# Patient Record
Sex: FEMALE | Race: BLACK OR AFRICAN AMERICAN | NOT HISPANIC OR LATINO | Employment: UNEMPLOYED | URBAN - METROPOLITAN AREA
[De-identification: names, ages, dates, MRNs, and addresses within clinical notes are randomized per-mention and may not be internally consistent; named-entity substitution may affect disease eponyms.]

---

## 2017-05-09 ENCOUNTER — TELEPHONE (OUTPATIENT)
Dept: OBSTETRICS AND GYNECOLOGY | Facility: CLINIC | Age: 47
End: 2017-05-09

## 2017-05-09 NOTE — TELEPHONE ENCOUNTER
----- Message from Keerthi Jean sent at 5/9/2017  2:58 PM CDT -----  Contact: self   Pt is requesting a refill for metronidazole (FLAGYL) 500 MG tablet and diflucan [phone]          Thanks

## 2018-01-26 ENCOUNTER — HOSPITAL ENCOUNTER (INPATIENT)
Facility: HOSPITAL | Age: 48
LOS: 3 days | Discharge: HOME | DRG: 557 | End: 2018-01-29
Attending: EMERGENCY MEDICINE | Admitting: HOSPITALIST
Payer: MEDICAID

## 2018-01-26 DIAGNOSIS — R53.1 WEAKNESS: ICD-10-CM

## 2018-01-26 DIAGNOSIS — R79.89 ABNORMAL SERUM THYROID STIMULATING HORMONE (TSH) LEVEL: ICD-10-CM

## 2018-01-26 DIAGNOSIS — N17.9 ACUTE RENAL FAILURE, UNSPECIFIED ACUTE RENAL FAILURE TYPE: Primary | ICD-10-CM

## 2018-01-26 DIAGNOSIS — Z45.2 ENCOUNTER FOR INTRAVENOUS LINE PLACEMENT: ICD-10-CM

## 2018-01-26 DIAGNOSIS — F19.10 POLYSUBSTANCE ABUSE: ICD-10-CM

## 2018-01-26 DIAGNOSIS — R40.4 ALTERED AWARENESS, TRANSIENT: ICD-10-CM

## 2018-01-26 DIAGNOSIS — E86.0 DEHYDRATION: ICD-10-CM

## 2018-01-26 DIAGNOSIS — K92.2 GASTROINTESTINAL HEMORRHAGE, UNSPECIFIED GASTROINTESTINAL HEMORRHAGE TYPE: ICD-10-CM

## 2018-01-26 DIAGNOSIS — R74.01 TRANSAMINITIS: ICD-10-CM

## 2018-01-26 DIAGNOSIS — M62.82 NON-TRAUMATIC RHABDOMYOLYSIS: ICD-10-CM

## 2018-01-26 PROBLEM — F41.9 ANXIETY: Chronic | Status: ACTIVE | Noted: 2018-01-26

## 2018-01-26 PROBLEM — F31.9 BIPOLAR 1 DISORDER: Chronic | Status: ACTIVE | Noted: 2018-01-26

## 2018-01-26 LAB
ABO + RH BLD: NORMAL
ALBUMIN SERPL BCP-MCNC: 2.6 G/DL
ALP SERPL-CCNC: 65 U/L
ALT SERPL W/O P-5'-P-CCNC: 221 U/L
AMMONIA PLAS-SCNC: 28 UMOL/L
AMORPH CRY URNS QL MICRO: ABNORMAL
AMPHET+METHAMPHET UR QL: NEGATIVE
ANION GAP SERPL CALC-SCNC: 13 MMOL/L
APAP SERPL-MCNC: <3 UG/ML
APAP SERPL-MCNC: <3 UG/ML
APTT BLDCRRT: 23.5 SEC
AST SERPL-CCNC: 540 U/L
BACTERIA #/AREA URNS HPF: ABNORMAL /HPF
BARBITURATES UR QL SCN>200 NG/ML: NEGATIVE
BASOPHILS # BLD AUTO: 0.01 K/UL
BASOPHILS NFR BLD: 0.1 %
BENZODIAZ UR QL SCN>200 NG/ML: NORMAL
BILIRUB SERPL-MCNC: 0.4 MG/DL
BILIRUB UR QL STRIP: ABNORMAL
BLD GP AB SCN CELLS X3 SERPL QL: NORMAL
BUN SERPL-MCNC: 67 MG/DL
BZE UR QL SCN: NEGATIVE
CALCIUM SERPL-MCNC: 7.4 MG/DL
CANNABINOIDS UR QL SCN: NEGATIVE
CHLORIDE SERPL-SCNC: 104 MMOL/L
CHLORIDE UR-SCNC: 43 MMOL/L
CK SERPL-CCNC: ABNORMAL U/L
CK SERPL-CCNC: ABNORMAL U/L
CLARITY UR: ABNORMAL
CO2 SERPL-SCNC: 15 MMOL/L
COLOR UR: YELLOW
CREAT SERPL-MCNC: 6.2 MG/DL
CREAT UR-MCNC: 166.9 MG/DL
CREAT UR-MCNC: 51.9 MG/DL
DIFFERENTIAL METHOD: ABNORMAL
EOSINOPHIL # BLD AUTO: 0.1 K/UL
EOSINOPHIL NFR BLD: 0.8 %
ERYTHROCYTE [DISTWIDTH] IN BLOOD BY AUTOMATED COUNT: 13 %
EST. GFR  (AFRICAN AMERICAN): 8 ML/MIN/1.73 M^2
EST. GFR  (NON AFRICAN AMERICAN): 7 ML/MIN/1.73 M^2
ETHANOL SERPL-MCNC: <10 MG/DL
GLUCOSE SERPL-MCNC: 73 MG/DL
GLUCOSE UR QL STRIP: NEGATIVE
HCT VFR BLD AUTO: 27.3 %
HGB BLD-MCNC: 9 G/DL
HGB UR QL STRIP: ABNORMAL
HYALINE CASTS #/AREA URNS LPF: 0 /LPF
INR PPP: 1
KETONES UR QL STRIP: NEGATIVE
LACTATE SERPL-SCNC: 2 MMOL/L
LEUKOCYTE ESTERASE UR QL STRIP: NEGATIVE
LIPASE SERPL-CCNC: 59 U/L
LYMPHOCYTES # BLD AUTO: 1 K/UL
LYMPHOCYTES NFR BLD: 12.1 %
MCH RBC QN AUTO: 28 PG
MCHC RBC AUTO-ENTMCNC: 33 G/DL
MCV RBC AUTO: 85 FL
METHADONE UR QL SCN>300 NG/ML: NEGATIVE
MICROSCOPIC COMMENT: ABNORMAL
MONOCYTES # BLD AUTO: 1 K/UL
MONOCYTES NFR BLD: 12.1 %
NEUTROPHILS # BLD AUTO: 6.2 K/UL
NEUTROPHILS NFR BLD: 74.7 %
NITRITE UR QL STRIP: POSITIVE
OB PNL STL: POSITIVE
OPIATES UR QL SCN: NORMAL
PCP UR QL SCN>25 NG/ML: NEGATIVE
PH UR STRIP: 5 [PH] (ref 5–8)
PLATELET # BLD AUTO: 193 K/UL
PMV BLD AUTO: 11.6 FL
POTASSIUM SERPL-SCNC: 4.1 MMOL/L
POTASSIUM UR-SCNC: 14 MMOL/L
PROCALCITONIN SERPL IA-MCNC: 1.1 NG/ML
PROT SERPL-MCNC: 5.5 G/DL
PROT UR QL STRIP: ABNORMAL
PROT UR-MCNC: 39 MG/DL
PROT/CREAT RATIO, UR: 0.75
PROTHROMBIN TIME: 10.7 SEC
RBC # BLD AUTO: 3.22 M/UL
RBC #/AREA URNS HPF: 2 /HPF (ref 0–4)
SALICYLATES SERPL-MCNC: <5 MG/DL
SODIUM SERPL-SCNC: 132 MMOL/L
SODIUM UR-SCNC: 50 MMOL/L
SP GR UR STRIP: 1.02 (ref 1–1.03)
SQUAMOUS #/AREA URNS HPF: 6 /HPF
TOXICOLOGY INFORMATION: NORMAL
URN SPEC COLLECT METH UR: ABNORMAL
UROBILINOGEN UR STRIP-ACNC: NEGATIVE EU/DL
WBC # BLD AUTO: 8.29 K/UL
WBC #/AREA URNS HPF: 0 /HPF (ref 0–5)

## 2018-01-26 PROCEDURE — 80053 COMPREHEN METABOLIC PANEL: CPT

## 2018-01-26 PROCEDURE — 85730 THROMBOPLASTIN TIME PARTIAL: CPT

## 2018-01-26 PROCEDURE — 84156 ASSAY OF PROTEIN URINE: CPT

## 2018-01-26 PROCEDURE — 87040 BLOOD CULTURE FOR BACTERIA: CPT

## 2018-01-26 PROCEDURE — 83690 ASSAY OF LIPASE: CPT

## 2018-01-26 PROCEDURE — 86850 RBC ANTIBODY SCREEN: CPT

## 2018-01-26 PROCEDURE — 63600175 PHARM REV CODE 636 W HCPCS

## 2018-01-26 PROCEDURE — 81000 URINALYSIS NONAUTO W/SCOPE: CPT

## 2018-01-26 PROCEDURE — 25000003 PHARM REV CODE 250: Performed by: INTERNAL MEDICINE

## 2018-01-26 PROCEDURE — 85025 COMPLETE CBC W/AUTO DIFF WBC: CPT

## 2018-01-26 PROCEDURE — 85610 PROTHROMBIN TIME: CPT

## 2018-01-26 PROCEDURE — 80307 DRUG TEST PRSMV CHEM ANLYZR: CPT

## 2018-01-26 PROCEDURE — B543ZZA ULTRASONOGRAPHY OF RIGHT JUGULAR VEINS, GUIDANCE: ICD-10-PCS | Performed by: EMERGENCY MEDICINE

## 2018-01-26 PROCEDURE — 84145 PROCALCITONIN (PCT): CPT

## 2018-01-26 PROCEDURE — 25000003 PHARM REV CODE 250: Performed by: HOSPITALIST

## 2018-01-26 PROCEDURE — 84300 ASSAY OF URINE SODIUM: CPT

## 2018-01-26 PROCEDURE — 63600175 PHARM REV CODE 636 W HCPCS: Performed by: EMERGENCY MEDICINE

## 2018-01-26 PROCEDURE — 82272 OCCULT BLD FECES 1-3 TESTS: CPT

## 2018-01-26 PROCEDURE — 05HM33Z INSERTION OF INFUSION DEVICE INTO RIGHT INTERNAL JUGULAR VEIN, PERCUTANEOUS APPROACH: ICD-10-PCS | Performed by: EMERGENCY MEDICINE

## 2018-01-26 PROCEDURE — 84133 ASSAY OF URINE POTASSIUM: CPT

## 2018-01-26 PROCEDURE — 25000003 PHARM REV CODE 250: Performed by: EMERGENCY MEDICINE

## 2018-01-26 PROCEDURE — 82550 ASSAY OF CK (CPK): CPT

## 2018-01-26 PROCEDURE — 80329 ANALGESICS NON-OPIOID 1 OR 2: CPT | Mod: 59

## 2018-01-26 PROCEDURE — 82140 ASSAY OF AMMONIA: CPT

## 2018-01-26 PROCEDURE — 63600175 PHARM REV CODE 636 W HCPCS: Performed by: PHYSICIAN ASSISTANT

## 2018-01-26 PROCEDURE — 94761 N-INVAS EAR/PLS OXIMETRY MLT: CPT

## 2018-01-26 PROCEDURE — 63600175 PHARM REV CODE 636 W HCPCS: Performed by: HOSPITALIST

## 2018-01-26 PROCEDURE — 20000000 HC ICU ROOM

## 2018-01-26 PROCEDURE — 82436 ASSAY OF URINE CHLORIDE: CPT

## 2018-01-26 PROCEDURE — 93005 ELECTROCARDIOGRAM TRACING: CPT

## 2018-01-26 PROCEDURE — 83605 ASSAY OF LACTIC ACID: CPT

## 2018-01-26 PROCEDURE — 87086 URINE CULTURE/COLONY COUNT: CPT

## 2018-01-26 PROCEDURE — 80320 DRUG SCREEN QUANTALCOHOLS: CPT

## 2018-01-26 PROCEDURE — 82550 ASSAY OF CK (CPK): CPT | Mod: 91

## 2018-01-26 RX ORDER — NALOXONE HCL 0.4 MG/ML
VIAL (ML) INJECTION
Status: COMPLETED
Start: 2018-01-26 | End: 2018-01-26

## 2018-01-26 RX ORDER — HYDROXYZINE PAMOATE 25 MG/1
25 CAPSULE ORAL EVERY 8 HOURS PRN
Status: DISCONTINUED | OUTPATIENT
Start: 2018-01-26 | End: 2018-01-27

## 2018-01-26 RX ORDER — VENLAFAXINE HYDROCHLORIDE 75 MG/1
75 CAPSULE, EXTENDED RELEASE ORAL DAILY
Status: DISCONTINUED | OUTPATIENT
Start: 2018-01-27 | End: 2018-01-29 | Stop reason: HOSPADM

## 2018-01-26 RX ORDER — LUBIPROSTONE 8 UG/1
8 CAPSULE ORAL
Status: DISCONTINUED | OUTPATIENT
Start: 2018-01-27 | End: 2018-01-29 | Stop reason: HOSPADM

## 2018-01-26 RX ORDER — CEFTRIAXONE 1 G/1
1 INJECTION, POWDER, FOR SOLUTION INTRAMUSCULAR; INTRAVENOUS
Status: COMPLETED | OUTPATIENT
Start: 2018-01-26 | End: 2018-01-26

## 2018-01-26 RX ORDER — GABAPENTIN 300 MG/1
300 CAPSULE ORAL 2 TIMES DAILY
COMMUNITY
End: 2020-10-29

## 2018-01-26 RX ORDER — LISINOPRIL AND HYDROCHLOROTHIAZIDE 10; 12.5 MG/1; MG/1
1 TABLET ORAL DAILY
Status: ON HOLD | COMMUNITY
End: 2018-01-29 | Stop reason: HOSPADM

## 2018-01-26 RX ORDER — HEPARIN SODIUM 5000 [USP'U]/ML
5000 INJECTION, SOLUTION INTRAVENOUS; SUBCUTANEOUS EVERY 12 HOURS
Status: DISCONTINUED | OUTPATIENT
Start: 2018-01-26 | End: 2018-01-29 | Stop reason: HOSPADM

## 2018-01-26 RX ORDER — ACETAMINOPHEN 325 MG/1
650 TABLET ORAL EVERY 4 HOURS PRN
Status: DISCONTINUED | OUTPATIENT
Start: 2018-01-26 | End: 2018-01-28

## 2018-01-26 RX ORDER — HEPARIN SODIUM 5000 [USP'U]/ML
7500 INJECTION, SOLUTION INTRAVENOUS; SUBCUTANEOUS EVERY 8 HOURS
Status: DISCONTINUED | OUTPATIENT
Start: 2018-01-26 | End: 2018-01-26 | Stop reason: DRUGHIGH

## 2018-01-26 RX ORDER — SODIUM CHLORIDE 0.9 % (FLUSH) 0.9 %
5 SYRINGE (ML) INJECTION
Status: DISCONTINUED | OUTPATIENT
Start: 2018-01-26 | End: 2018-01-29 | Stop reason: HOSPADM

## 2018-01-26 RX ORDER — NALOXONE HCL 0.4 MG/ML
0.4 VIAL (ML) INJECTION
Status: COMPLETED | OUTPATIENT
Start: 2018-01-26 | End: 2018-01-26

## 2018-01-26 RX ORDER — ONDANSETRON 2 MG/ML
4 INJECTION INTRAMUSCULAR; INTRAVENOUS EVERY 6 HOURS PRN
Status: DISCONTINUED | OUTPATIENT
Start: 2018-01-26 | End: 2018-01-29 | Stop reason: HOSPADM

## 2018-01-26 RX ORDER — ALBUTEROL SULFATE 0.83 MG/ML
2.5 SOLUTION RESPIRATORY (INHALATION) EVERY 4 HOURS PRN
Status: DISCONTINUED | OUTPATIENT
Start: 2018-01-26 | End: 2018-01-29 | Stop reason: HOSPADM

## 2018-01-26 RX ORDER — ONDANSETRON 8 MG/1
8 TABLET, ORALLY DISINTEGRATING ORAL EVERY 6 HOURS PRN
Status: DISCONTINUED | OUTPATIENT
Start: 2018-01-26 | End: 2018-01-29 | Stop reason: HOSPADM

## 2018-01-26 RX ADMIN — DEXTROSE MONOHYDRATE: 5 INJECTION, SOLUTION INTRAVENOUS at 03:01

## 2018-01-26 RX ADMIN — NALOXONE HYDROCHLORIDE 0.4 MG: 0.4 INJECTION, SOLUTION INTRAMUSCULAR; INTRAVENOUS; SUBCUTANEOUS at 08:01

## 2018-01-26 RX ADMIN — SODIUM CHLORIDE, SODIUM LACTATE, POTASSIUM CHLORIDE, AND CALCIUM CHLORIDE 1000 ML: .6; .31; .03; .02 INJECTION, SOLUTION INTRAVENOUS at 11:01

## 2018-01-26 RX ADMIN — NALOXONE HYDROCHLORIDE 0.4 MG/HR: 1 INJECTION PARENTERAL at 07:01

## 2018-01-26 RX ADMIN — CEFTRIAXONE SODIUM 1 G: 1 INJECTION, POWDER, FOR SOLUTION INTRAMUSCULAR; INTRAVENOUS at 09:01

## 2018-01-26 RX ADMIN — HEPARIN SODIUM 5000 UNITS: 5000 INJECTION, SOLUTION INTRAVENOUS; SUBCUTANEOUS at 09:01

## 2018-01-26 RX ADMIN — SODIUM CHLORIDE, SODIUM LACTATE, POTASSIUM CHLORIDE, AND CALCIUM CHLORIDE 1000 ML: .6; .31; .03; .02 INJECTION, SOLUTION INTRAVENOUS at 08:01

## 2018-01-26 RX ADMIN — Medication 0.4 MG: at 07:01

## 2018-01-26 RX ADMIN — NALOXONE HYDROCHLORIDE 0.4 MG/HR: 1 INJECTION PARENTERAL at 08:01

## 2018-01-26 RX ADMIN — SODIUM CHLORIDE 1000 ML: 0.9 INJECTION, SOLUTION INTRAVENOUS at 07:01

## 2018-01-26 RX ADMIN — NALOXONE HYDROCHLORIDE 0.4 MG: 0.4 INJECTION, SOLUTION INTRAMUSCULAR; INTRAVENOUS; SUBCUTANEOUS at 07:01

## 2018-01-26 RX ADMIN — SODIUM CHLORIDE 1000 ML: 0.9 INJECTION, SOLUTION INTRAVENOUS at 02:01

## 2018-01-26 RX ADMIN — SODIUM CHLORIDE 1000 ML: 0.9 INJECTION, SOLUTION INTRAVENOUS at 09:01

## 2018-01-26 RX ADMIN — NALOXONE HYDROCHLORIDE 0.4 MG/HR: 1 INJECTION PARENTERAL at 02:01

## 2018-01-26 NOTE — ASSESSMENT & PLAN NOTE
BUN 67, Cr 6.2 on admission.  CT abd/pelvis shows no renal abnormalities.  [facility] consulted and saw patient in ED. Switched IVF to sodium bicarbonate in D5. Avoid nephrotoxins and renally dose meds. Monitor.

## 2018-01-26 NOTE — ASSESSMENT & PLAN NOTE
Elevated LFTs   Pt had fall down 5 steps on 1/25/2018. Found on floor and minimally responsive on morning of 1/26/2018.  CK 32,863 in ED.  Drug screen positive for opioids and benzos.  Started on IVF which will be continued.  Elevated BUN, Cr, AST and ALT likely 2/2 rhabdo.  Monitor.

## 2018-01-26 NOTE — ED NOTES
Pt states she took 2 Xanbar at home, pt states on Saturday. Pt remains disoriented with slurred speech. Pt awakens to touch. On cardiac monitor. SR up and CB in reach. Pt is visible from nurses station.

## 2018-01-26 NOTE — ASSESSMENT & PLAN NOTE
Pt states used to take lisinopril/HCTZ for BP but has not taken in awhile. Hypotensive since arrival to ED. Monitor.

## 2018-01-26 NOTE — ED NOTES
Pt resting on stretcher with eyes closed. Pt becoming more difficult to arouse, nurse having to shake pt and stimulate her. VS stable on cardiac monitor. Dr. Bonilla informed and Narcan drip ordered.
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
## 2018-01-26 NOTE — ED NOTES
"Report received from SAGRARIO Coreas.  Patient is lethargic, off/on opens eyes, slurred speech.  Patient was found down by Family member.  Patient is responsive to Narcan.  Unable to understand what medications she took.  She states, I took "some pills that my son gave me." Patient complaints of generalized body aches, lower back pain. Patient hypotensive, and dry mucosa and lips.  "

## 2018-01-26 NOTE — ED NOTES
Patient identifiers verified and correct for Shanthi Brewer.    LOC: The patient is lethargic, oriented to self only, with slurred speech.  APPEARANCE: Patient resting comfortably and in no acute distress, patient is clean and well groomed, patient's clothing is properly fastened.  SKIN: The skin is warm and dry, color consistent with ethnicity, patient dry mucus membranes.   MUSCULOSKELETAL: Patient has generalized body aches, back pain and generalized weakness.  RESPIRATORY: Airway is open and patent, respirations are spontaneous, patient has a normal effort and rate, no accessory muscle use noted, bilateral breath sounds clear.  CARDIAC: Patient has a normal rate and regular rhythm, no periphreal edema noted.  Hypotension.  ABDOMEN: Soft and non tender to palpation, no distention noted, normoactive bowel sounds present in all four quadrants.  :  Dark, foul urine

## 2018-01-26 NOTE — ED NOTES
Pt resting on stretcher. On cardiac monitor. Pt arouses to touch and name. Pt remains disoriented. SR up and CB in reach. Pt visible from nurses station. Narcan drip is complete. Dr. Bonilla informed.

## 2018-01-26 NOTE — ASSESSMENT & PLAN NOTE
"Drug screen positive for opioids and benzos. Pt states, "I'm not a drug addict."  Admits to taking a pain pill from her neighbor after her fall on 1/26/2018.  Is prescribed alprazolam 2 mg and tramadol 50 mg and zolpidem 10 mg which she states she is taking.  Hold for now. Monitor for withdrawal.      "

## 2018-01-26 NOTE — ASSESSMENT & PLAN NOTE
Anxiety  Pt is calm and cooperative at present.  Sees Dr. Juan Fountain for Psychiatry who prescribes alprazolam 2 mg TID and velafaxine 75 mg and zolpidem 10 mg daily.  Resume venlafaxine for now.
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
## 2018-01-26 NOTE — ED NOTES
Pt's mother called unit. Informed pt's mother that we are unable to release pt's information without pt's consent. Mother requests that when able to pt call her.   Mother, Tanesha Owens, [phone]

## 2018-01-26 NOTE — ED NOTES
Patient responds after Narcan is given.  Dr. Bonilla notified and will add a Narcan drip. Will continue to monitor closely.
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
## 2018-01-26 NOTE — ED PROVIDER NOTES
Encounter Date: 2018    SCRIBE #1 NOTE: I, Krunal Mariano, am scribing for, and in the presence of,  Dr. Arturo Bonilla. I have scribed the entire note.       History   No chief complaint on file.    The patient is a 47 y.o. female who presents to the ED via EMS after being found unresponsive. Per EMS, boyfriend found her unresponsive at home who called EMS. EMS reports initial GCS 7. She was given 2 mg narcan en route, improved GCS to 7. On arrival, patient follows some commands, but is unable to provide any information regarding HPI or ROS. Of note, she was seen yesterday for a fall and low back pain, Dx with UTI.       The history is provided by the EMS personnel.     Review of patient's allergies indicates:  No Known Allergies  Past Medical History:   Diagnosis Date    Anxiety     Bipolar 1 disorder     Cervical disc disorder of cervicothoracic region     Hypertension     No medication since summer 2012    IBS (irritable bowel syndrome)     Mental disorder     Depression    Neck pain     Sciatica      Past Surgical History:   Procedure Laterality Date    CERVICAL CERCLAGE  , ,      SECTION      DILATION AND CURETTAGE OF UTERUS      HYSTERECTOMY      Dr Hoang Li    laser of vulva      and vaginal cuff for dysplasia    OOPHORECTOMY      with Ex Lap for pain and adhesions by Dr Kim    OOPHORECTOMY      with Ex Lap for pain and adhesions by me    PELVIC LAPAROSCOPY  2006    pelvic pain and adhesions by me     Family History   Problem Relation Age of Onset    Hypertension Mother      Social History   Substance Use Topics    Smoking status: Never Smoker    Smokeless tobacco: Never Used    Alcohol use No     Review of Systems   Unable to perform ROS: Mental status change       Physical Exam     Initial Vitals   BP Pulse Resp Temp SpO2   -- -- -- -- --      MAP       --         Physical Exam    Nursing note and vitals reviewed.  Constitutional: She  appears well-developed and well-nourished. She appears distressed (Moderate discomfort).   Somewhat cachectic appearing   HENT:   Head: Normocephalic and atraumatic.   Dry mucous membranes; Oropharynx clear   Eyes: EOM are normal. Pupils are equal, round, and reactive to light.   Neck: Normal range of motion. Neck supple. No tracheal deviation present.   Cardiovascular: Regular rhythm, normal heart sounds and intact distal pulses.   tachycardic   Pulmonary/Chest: Breath sounds normal. No respiratory distress. She has no wheezes. She has no rhonchi. She has no rales.   Abdominal: Soft. Bowel sounds are normal. She exhibits no distension. There is no tenderness.   Musculoskeletal: Normal range of motion. She exhibits no tenderness.   Neurological: She is alert. She has normal strength. No cranial nerve deficit or sensory deficit. GCS eye subscore is 4. GCS verbal subscore is 5. GCS motor subscore is 4.   Skin: Skin is warm and dry.   Psychiatric:   confused         ED Course   Critical Care  Date/Time: 1/26/2018 1:35 PM  Performed by: ROXY WALKER  Authorized by: ROXY WALKER   Direct patient critical care time: 45 minutes  Additional history critical care time: 10 minutes  Ordering / reviewing critical care time: 30 minutes  Documentation critical care time: 15 minutes  Consulting other physicians critical care time: 15 minutes  Consult with family critical care time: 30 minutes  Total critical care time (exclusive of procedural time) : 145 minutes  Critical care time was exclusive of separately billable procedures and treating other patients.  Critical care was necessary to treat or prevent imminent or life-threatening deterioration of the following conditions: circulatory failure, sepsis, renal failure and dehydration.  Critical care was time spent personally by me on the following activities: blood draw for specimens, discussions with consultants, interpretation of cardiac output measurements,  evaluation of patient's response to treatment, examination of patient, obtaining history from patient or surrogate, ordering and performing treatments and interventions, ordering and review of laboratory studies, ordering and review of radiographic studies, pulse oximetry, re-evaluation of patient's condition, review of old charts and vascular access procedures.        Labs Reviewed - No data to display  EKG Readings: (Independently Interpreted)   Initial Reading: No STEMI. Previous EKG: Compared with most recent EKG Previous EKG Date: 12/30/15 (Minimal change). Rhythm: Sinus Tachycardia. Heart Rate: 102. Ectopy: No Ectopy. Conduction: Normal. ST Segments: Normal ST Segments. T Waves: Normal. Axis: Normal.       X-Rays:   Independently Interpreted Readings:   Other Readings:  Imaging interpreted by radiologist and visualized by me:     Imaging Results          US Abdomen Limited (Final result)  Result time 01/26/18 13:04:35    Final result by Vinny Grigsby MD (01/26/18 13:04:35)                 Impression:      1.  Prominent common duct, nonspecific noting the common duct measured 0.6-0.7 cm on CT 7/25/2014, and may be a chronic finding.  Correlation with laboratory values, ERCP if warranted.        Electronically signed by: VINNY GRIGSBY MD  Date:     01/26/18  Time:    13:04              Narrative:    Ultrasound abdomen limited    Clinical history: Transaminitis    Comparison: CT 1/26/2018    Technique:  Real-time sonography was performed of the abdomen using transabdominal technique.    Findings:    The pancreas is obscured by overlying structures.  The gallbladder is unremarkable without wall thickening or cholelithiasis.  Sonographic Rainey's sign is negative.  The common duct is prominent measuring up to 0.7 cm.  The hepatic parenchyma is homogeneous, the liver is not enlarged.  The right kidney is unremarkable.  No ascites.                             X-Ray Chest AP Portable (Final result)  Result time  01/26/18 11:10:14    Final result by Ashwin Hurtado MD (01/26/18 11:10:14)                 Impression:     Stable appearance of the chest.      Electronically signed by: ASHWIN HURTADO MD  Date:     01/26/18  Time:    11:10              Narrative:    Chest pain.    Comparison: 1/26/18.    Single frontal view of the chest was obtained.    Trachea is patent the hepatic silhouette appears normal in size. The overall appearance of the lungs is similar to the study from earlier the same day. Postsurgical change the right humerus noted. No effusion, pneumothorax or free air below the diaphragm. Also similar to before.                             CT Abdomen Pelvis  Without Contrast (Final result)  Result time 01/26/18 11:03:29    Final result by Gladys Howard MD (01/26/18 11:03:29)                 Impression:      1. Gallbladder distention and mild gallbladder wall thickening. No pericholecystic fluid or inflammatory change. Findings possibly from hepatitis or chronic cholecystitis.    2. Trace ascites over the hepatic dome.      Electronically signed by: GLADYS HOWARD MD  Date:     01/26/18  Time:    11:03              Narrative:    CT abdomen and pelvis without contrast    Technique: Helical CT images.    Comparison: July 25, 2014 and November 24, 2013    Findings:  Both lung bases demonstrate dependent atelectasis.    The gallbladder is distended, and demonstrates mild wall thickening. No pericholecystic inflammatory change.    The intracardiac blood is low density compared to the left ventricular myocardium, suspicious for anemia.    Trace ascites over the hepatic dome.    The liver, spleen, pancreas, and adrenals are unremarkable.    No bowel wall thickening or dilation. Normal appendix.    The kidneys and ureters are unremarkable. Urinary bladder is unremarkable.    No adenopathy.    Hysterectomy.    No acute bone abnormality.    Body wall soft tissues are unremarkable.                             X-Ray  Chest AP Portable (Final result)  Result time 01/26/18 09:47:53    Final result by Kevin Queen MD (01/26/18 09:47:53)                 Impression:     No acute cardiopulmonary process.      Electronically signed by: KEVIN QUEEN MD  Date:     01/26/18  Time:    09:47              Narrative:    Chest x-ray, 1 view    Comparison: 5/20/16    Findings:  There is no consolidation, effusion, or pneumothorax.  Cardiomediastinal silhouette is unremarkable.  Note made of metallic anchor in the right humeral head.                              Medical Decision Making:   Initial Assessment:   46 y/o F brought to ED by EMS for AMS, decreased LOC  Differential Diagnosis:   Substance abuse, electrolyte dyscrasia, hepatic v other encephalopathy, dehydration, sepsis, meningitis, ICH, SAH, CVA  Independently Interpreted Test(s):   I have ordered and independently interpreted X-rays - see prior notes.  I have ordered and independently interpreted EKG Reading(s) - see prior notes  Clinical Tests:   Lab Tests: Reviewed       <> Summary of Lab: Rhabdo; OLGA; UTI;   ED Management:  Initially difficult to get access on the patient. I cleaned the right IJ with chlorhexadine, and using sterile precautions and sterile cover on the U/S probe, placed a 4.5cm long 18 gauge catheter in the right IJ. Catheter visualized in the IJ lumen with U/S. Line draws and flushes easily. Bio-occlusive dressing placed. Patient tolerated procedure well.    1:20 PM  Discussed this patient case with [facility] nephrology resident Dr. Gordillo and GI Dr. Milton    Px given heavy IV resuscitation. Her mental status has improved with narcan infusion and IV hydration. While not completely oriented, she has shown marked improvement. I discussed the case with Dr. Quinones, who will see and admit the patient.                    ED Course      Clinical Impression:   The primary encounter diagnosis was Acute renal failure, unspecified acute renal failure type. Diagnoses of  Altered awareness, transient, Weakness, Encounter for intravenous line placement, Dehydration, Non-traumatic rhabdomyolysis, Transaminitis, Polysubstance abuse, and Gastrointestinal hemorrhage, unspecified gastrointestinal hemorrhage type were also pertinent to this visit.    Disposition:   Disposition: Admitted  Condition: Serious  I, Dr. Arturo Bonilla, personally performed the services described in this documentation. All medical record entries made by the scribe were at my direction and in my presence.  I have reviewed the chart and agree that the record reflects my personal performance and is accurate and complete. Arturo Bonilla MD.  1:44 PM 01/26/2018                          Arturo Bonilla MD  01/26/18 2112

## 2018-01-26 NOTE — ED NOTES
Pt resting on stretcher. Pt's family is at bedside. Pt remains altered mental status but beginning to answer questions more appropriately and requesting lunch.

## 2018-01-27 PROBLEM — E83.42 HYPOMAGNESEMIA: Status: ACTIVE | Noted: 2018-01-27

## 2018-01-27 PROBLEM — S06.0X1A CONCUSSION WITH LOSS OF CONSCIOUSNESS OF 30 MINUTES OR LESS: Status: ACTIVE | Noted: 2018-01-27

## 2018-01-27 LAB
ALBUMIN SERPL BCP-MCNC: 2.3 G/DL
ALP SERPL-CCNC: 64 U/L
ALT SERPL W/O P-5'-P-CCNC: 225 U/L
ANION GAP SERPL CALC-SCNC: 11 MMOL/L
AST SERPL-CCNC: 452 U/L
BACTERIA UR CULT: NO GROWTH
BILIRUB SERPL-MCNC: 0.3 MG/DL
BUN SERPL-MCNC: 57 MG/DL
CALCIUM SERPL-MCNC: 7.9 MG/DL
CHLORIDE SERPL-SCNC: 104 MMOL/L
CK SERPL-CCNC: ABNORMAL U/L
CO2 SERPL-SCNC: 26 MMOL/L
CREAT SERPL-MCNC: 4.7 MG/DL
ERYTHROCYTE [DISTWIDTH] IN BLOOD BY AUTOMATED COUNT: 12.8 %
EST. GFR  (AFRICAN AMERICAN): 12 ML/MIN/1.73 M^2
EST. GFR  (NON AFRICAN AMERICAN): 10 ML/MIN/1.73 M^2
GLUCOSE SERPL-MCNC: 85 MG/DL
HCT VFR BLD AUTO: 27 %
HGB BLD-MCNC: 9.3 G/DL
MAGNESIUM SERPL-MCNC: 1.4 MG/DL
MCH RBC QN AUTO: 28.1 PG
MCHC RBC AUTO-ENTMCNC: 34.4 G/DL
MCV RBC AUTO: 82 FL
PHOSPHATE SERPL-MCNC: 4.6 MG/DL
PLATELET # BLD AUTO: 205 K/UL
PMV BLD AUTO: 11.6 FL
POTASSIUM SERPL-SCNC: 3.5 MMOL/L
PROT SERPL-MCNC: 5.3 G/DL
RBC # BLD AUTO: 3.31 M/UL
SODIUM SERPL-SCNC: 141 MMOL/L
T4 FREE SERPL-MCNC: 0.68 NG/DL
TSH SERPL DL<=0.005 MIU/L-ACNC: 0.1 UIU/ML
WBC # BLD AUTO: 8.75 K/UL

## 2018-01-27 PROCEDURE — 82550 ASSAY OF CK (CPK): CPT

## 2018-01-27 PROCEDURE — 84439 ASSAY OF FREE THYROXINE: CPT

## 2018-01-27 PROCEDURE — 25000003 PHARM REV CODE 250: Performed by: HOSPITALIST

## 2018-01-27 PROCEDURE — 99900035 HC TECH TIME PER 15 MIN (STAT)

## 2018-01-27 PROCEDURE — 94761 N-INVAS EAR/PLS OXIMETRY MLT: CPT

## 2018-01-27 PROCEDURE — 25000003 PHARM REV CODE 250: Performed by: PHYSICIAN ASSISTANT

## 2018-01-27 PROCEDURE — 25000003 PHARM REV CODE 250: Performed by: INTERNAL MEDICINE

## 2018-01-27 PROCEDURE — 83735 ASSAY OF MAGNESIUM: CPT

## 2018-01-27 PROCEDURE — 85027 COMPLETE CBC AUTOMATED: CPT

## 2018-01-27 PROCEDURE — 63600175 PHARM REV CODE 636 W HCPCS: Performed by: PHYSICIAN ASSISTANT

## 2018-01-27 PROCEDURE — 84100 ASSAY OF PHOSPHORUS: CPT

## 2018-01-27 PROCEDURE — 80053 COMPREHEN METABOLIC PANEL: CPT

## 2018-01-27 PROCEDURE — 63600175 PHARM REV CODE 636 W HCPCS: Performed by: HOSPITALIST

## 2018-01-27 PROCEDURE — 11000001 HC ACUTE MED/SURG PRIVATE ROOM

## 2018-01-27 PROCEDURE — 84443 ASSAY THYROID STIM HORMONE: CPT

## 2018-01-27 RX ORDER — ALPRAZOLAM 0.25 MG/1
0.25 TABLET ORAL 2 TIMES DAILY PRN
Status: DISCONTINUED | OUTPATIENT
Start: 2018-01-27 | End: 2018-01-29 | Stop reason: HOSPADM

## 2018-01-27 RX ORDER — MAG HYDROX/ALUMINUM HYD/SIMETH 200-200-20
30 SUSPENSION, ORAL (FINAL DOSE FORM) ORAL EVERY 6 HOURS PRN
Status: DISCONTINUED | OUTPATIENT
Start: 2018-01-27 | End: 2018-01-29 | Stop reason: HOSPADM

## 2018-01-27 RX ORDER — TRAMADOL HYDROCHLORIDE 50 MG/1
50 TABLET ORAL EVERY 12 HOURS PRN
Status: DISCONTINUED | OUTPATIENT
Start: 2018-01-27 | End: 2018-01-29 | Stop reason: HOSPADM

## 2018-01-27 RX ORDER — LANOLIN ALCOHOL/MO/W.PET/CERES
400 CREAM (GRAM) TOPICAL ONCE
Status: COMPLETED | OUTPATIENT
Start: 2018-01-27 | End: 2018-01-27

## 2018-01-27 RX ADMIN — SODIUM BICARBONATE: 84 INJECTION, SOLUTION INTRAVENOUS at 02:01

## 2018-01-27 RX ADMIN — VENLAFAXINE HYDROCHLORIDE 75 MG: 75 CAPSULE, EXTENDED RELEASE ORAL at 08:01

## 2018-01-27 RX ADMIN — MAGNESIUM OXIDE TAB 400 MG (241.3 MG ELEMENTAL MG) 400 MG: 400 (241.3 MG) TAB at 10:01

## 2018-01-27 RX ADMIN — LUBIPROSTONE 8 MCG: 8 CAPSULE, GELATIN COATED ORAL at 08:01

## 2018-01-27 RX ADMIN — DEXTROSE MONOHYDRATE: 5 INJECTION, SOLUTION INTRAVENOUS at 12:01

## 2018-01-27 RX ADMIN — ALUMINUM HYDROXIDE, MAGNESIUM HYDROXIDE, SIMETHICONE: 400; 400; 40 SUSPENSION ORAL at 11:01

## 2018-01-27 RX ADMIN — HEPARIN SODIUM 5000 UNITS: 5000 INJECTION, SOLUTION INTRAVENOUS; SUBCUTANEOUS at 08:01

## 2018-01-27 RX ADMIN — DEXTROSE MONOHYDRATE: 5 INJECTION, SOLUTION INTRAVENOUS at 06:01

## 2018-01-27 RX ADMIN — NALOXONE HYDROCHLORIDE 0.4 MG/HR: 1 INJECTION PARENTERAL at 12:01

## 2018-01-27 RX ADMIN — HEPARIN SODIUM 5000 UNITS: 5000 INJECTION, SOLUTION INTRAVENOUS; SUBCUTANEOUS at 09:01

## 2018-01-27 RX ADMIN — NALOXONE HYDROCHLORIDE 0.4 MG/HR: 1 INJECTION PARENTERAL at 06:01

## 2018-01-27 NOTE — NURSING TRANSFER
Nursing Transfer Note      1/27/2018     Transfer To: 422    Transfer via bed    Transfer with cardiac monitoring    Transported by RN and transport    Medicines sent: yes (constipation medication)    Chart send with patient: Yes    Notified: pt notified family via cellphone    Patient reassessed at: 01/27/2018 @ 1600    Upon arrival to floor: cardiac monitor applied, patient oriented to room, call bell in reach and bed in lowest position, new RN Temi @ bedside.

## 2018-01-27 NOTE — PLAN OF CARE
Problem: Patient Care Overview  Goal: Plan of Care Review  Outcome: Ongoing (interventions implemented as appropriate)  Plan of care reviewed with patient. Patient verbalized complete understanding. Patient request dial soap to wash up.. Patient advised we do not have dial soap.  Patient was given soap/ sponge pack. PCT offered to help the patient. Fall precautions maintained. Bed in lowest position, locked, call light within reach, and bed alarm on. Side rails up x's 2 with slip resistant socks on. Nurse instructed patient to notify staff for any assistance and pt verbalized complete understanding. Patient on telemetry since 1600 with no ectopy noted. Will continue to monitor.

## 2018-01-27 NOTE — PROGRESS NOTES
"[facility] Hospital Medicine  Progress Note    Patient Name: Shanthi Brewer  MRN: [id number]  Patient Class: IP- Inpatient   Admission Date: 1/26/2018  Length of Stay: 1 days  Attending Physician: Curtis Quinones MD  Primary Care Provider: Gilda Aburto MD        Subjective:     Principal Problem:Non-traumatic rhabdomyolysis    HPI:  48 yo female with HTN, anxiety and depression and bipolar disorder presents to [facility] ED on 1/26/18 after been found on the floor with decreased level of consciousness by her boyfriend, Ramiro Wiggins.  Pt reports that she fell down some stairs yesterday after she tripped over her dog.  She went to [facility] ED where head CT and L-spine x-ray were done showing no acute abnormalities.  She was given IM hydromorphone and was discharged home.  Pt reported then that she was seeing and hearing people that were not present.  She admits to taking ibuprofen, gabapentin, tramadol and a "pain pill" she got from her neighbor yesterday.  Pt states, "I'm not a drug addict."  She currently c/o back pain, epigastric pain and dyspnea.  Denies fever, HA, CP, N/V/D/C.  PCP Dr. Gilda Aburto. Psychiatrist Dr. Fountain.      Prescription Monitoring Program report reveals pt is prescribed Tramadol 50 mg BID and was prescribed #30 on 1/22/18.  She was prescribed zolpidem tartrate 10 mg nightly, #30 on 1/22/18 and alprazolam 2 mg TID on 1/2/2018. She states she drinks alcohol occasionally, none in the past few days. Denies use of tobacco or illicit drugs.  Mother alive with DM; father alive with mental health issues.  She has numerous brothers and sisters who are healthy per patient. She has 2 sons.    Work-up in ED: Pt initially hypotensive, improved with IVF.  Creatinine 6.2, BUN 67, CK 32,863, , . Drug screen positive for benzos and opioids.  US Abdomen done for elevated LFTs: prominent common duct, noting that it was same dilated size on 7/25/2014.  CT abd/pelvis: " gallbladder distention and mild gallbladder wall thickening. Trace ascites over hepatic dome.  Pt intermittently unresponsive in ED, improved with narcan infusion.  Admitting to [facility].  [facility] was consulted in ED.      Hospital Course:  Patient alert and oriented today.  Labs improving.  Transferring out of ICU.      Interval History:     Review of Systems   Constitutional: Negative for chills and fever.   Respiratory: Negative for cough and shortness of breath.    Cardiovascular: Negative for chest pain and palpitations.   Gastrointestinal: Negative for nausea and vomiting.   Musculoskeletal: Positive for arthralgias, back pain and myalgias.   Neurological: Negative for dizziness and headaches.     Objective:     Vital Signs (Most Recent):  Temp: 98.6 °F (37 °C) (01/27/18 0715)  Pulse: 96 (01/27/18 1000)  Resp: 15 (01/27/18 1000)  BP: 115/76 (01/27/18 1000)  SpO2: 98 % (01/27/18 1000) Vital Signs (24h Range):  Temp:  [97.8 °F (36.6 °C)-98.6 °F (37 °C)] 98.6 °F (37 °C)  Pulse:  [] 96  Resp:  [] 15  SpO2:  [94 %-100 %] 98 %  BP: ()/(50-83) 115/76     Weight: 77 kg (169 lb 12.1 oz)  Body mass index is 33.15 kg/m².    Intake/Output Summary (Last 24 hours) at 01/27/18 1116  Last data filed at 01/27/18 1000   Gross per 24 hour   Intake           2016.5 ml   Output             5785 ml   Net          -3768.5 ml      Physical Exam   Constitutional: She is oriented to person, place, and time. No distress.   Cardiovascular: Normal rate and regular rhythm.    No murmur heard.  Pulmonary/Chest: Effort normal and breath sounds normal. No respiratory distress. She has no wheezes.   Abdominal: Soft. There is no tenderness.   Musculoskeletal: She exhibits no edema.   Neurological: She is alert and oriented to person, place, and time.   Skin:   No ecchymosis or abrasions on back where patient is hurting.    Psychiatric: She has a normal mood and affect. Thought content normal.   Nursing  "note and vitals reviewed.      Significant Labs:   CBC:   Recent Labs  Lab 01/26/18  0804 01/27/18  0445   WBC 8.29 8.75   HGB 9.0* 9.3*   HCT 27.3* 27.0*    205     CMP:   Recent Labs  Lab 01/26/18  0804 01/27/18  0445   * 141   K 4.1 3.5    104   CO2 15* 26   GLU 73 85   BUN 67* 57*   CREATININE 6.2* 4.7*   CALCIUM 7.4* 7.9*   PROT 5.5* 5.3*   ALBUMIN 2.6* 2.3*   BILITOT 0.4 0.3   ALKPHOS 65 64   * 452*   * 225*   ANIONGAP 13 11   EGFRNONAA 7* 10*     TSH:   Recent Labs  Lab 01/27/18  0445   TSH 0.102*     Free T4 0.68    CK 25,317    Significant Imaging: no new    Assessment/Plan:      * Non-traumatic rhabdomyolysis    Elevated LFTs   CK 32,863 >> 02504 >> 25,317.  Pt had fall down 5 steps on 1/25/2018. Found on floor and minimally responsive on morning of 1/26/2018.  Drug screen positive for opioids and benzos which patient admits to taking.  Started on IVF which will be continued.  Elevated BUN, Cr, AST and ALT likely 2/2 rhabdo.  Monitor.           Acute renal failure    Improved to BUN 57, Cr 4.7.  Suspect 2/2 rhabdomyolysis.  CT abd/pelvis shows no renal abnormalities.  [facility] consulted and saw patient in ED. Switched IVF to sodium bicarbonate in D5. Avoid nephrotoxins and renally dose meds. Monitor.           Concussion with loss of consciousness of 30 minutes or less    Pt had fall down 5 steps onto concrete landing on 1/25/2018. States that she hit her head. Had CT in ED on 1/25 that showed no acute abnormalities.  Pt reports seeing and hearing things that were not there.  Boyfriend found her unresponsive on morning of 1/26 and she was altered on presentation to ED that day.  Thinking more clearly today.  D/C narcan infusion and move out of ICU.  Continue to monitor with neuro checks.           Polysubstance abuse    Drug screen positive for opioids and benzos. Pt is prescribed both and states, "I'm not a drug addict."  Admits to taking a pain pill from her " neighbor after her fall on 1/26/2018.  Is prescribed alprazolam 2 mg and tramadol 50 mg and zolpidem 10 mg.  Will restart alprazolam 0.25 mg BID PRN today.            Normocytic anemia    Hgb 9.3.  No visible, active bleeding. Monitor.           Essential hypertension    Pt states used to take lisinopril/HCTZ for BP but has not taken in awhile. Hypo to normotensive. Monitor.           Bipolar 1 disorder    Anxiety  Pt is calm and cooperative at present.  Sees Dr. Juan Fountain for Psychiatry who prescribes alprazolam 2 mg TID and velafaxine 75 mg and zolpidem 10 mg daily.  Continue venlafaxine. Resume PRN alprazolam 0.25 mg BID.            Hypomagnesemia    Mg 1.4 this morning.  Replace PO. Monitor.             VTE Risk Mitigation         Ordered     heparin (porcine) injection 5,000 Units  Every 12 hours     Route:  Subcutaneous        01/26/18 1638     Medium Risk of VTE  Once      01/26/18 1619          Critical care time spent on the evaluation and treatment of severe organ dysfunction, review of pertinent labs and imaging studies, discussions with consulting providers and discussions with patient/family: 30 minutes.    Lesly Collins PA-C  Department of Hospital Medicine   [facility]  Pager: [phone]

## 2018-01-27 NOTE — PLAN OF CARE
Chief Complaint   Patient presents with    Back Pain       back pain following a fall down 5 steps last night, pt reports that she can't walk due to pain and that all of her fingers are numb following the fall      Pt is independent with ADLs, no HH/HME. Lives alone. Has supportive family who can provide transportation on discharge       01/27/18 1134   Discharge Assessment   Assessment Type Discharge Planning Assessment   Confirmed/corrected address and phone number on facesheet? Yes   Assessment information obtained from? Patient;Caregiver  (pt and mother)   Expected Length of Stay (days) 3   Communicated expected length of stay with patient/caregiver yes   Prior to hospitilization cognitive status: Alert/Oriented   Prior to hospitalization functional status: Independent   Current cognitive status: Alert/Oriented   Current Functional Status: Needs Assistance   Facility Arrived From: (home)   Lives With alone   Able to Return to Prior Arrangements yes   Is patient able to care for self after discharge? Yes   Who are your caregiver(s) and their phone number(s)? son: Matty Brewer  [phone]   Patient's perception of discharge disposition home or selfcare   Readmission Within The Last 30 Days no previous admission in last 30 days   Patient currently being followed by outpatient case management? No   Patient currently receives any other outside agency services? No   Equipment Currently Used at Home none   Do you have any problems affording any of your prescribed medications? No   Is the patient taking medications as prescribed? yes   Does the patient have transportation home? Yes   Transportation Available family or friend will provide;car   Dialysis Name and Scheduled days N/A   Does the patient receive services at the [facility]? No   Discharge Plan A Home   Discharge Plan B Home with family   Patient/Family In Agreement With Plan yes     Theresa Klein RN Transitional Navigator  [phone]

## 2018-01-27 NOTE — ASSESSMENT & PLAN NOTE
Anxiety  Pt is calm and cooperative at present.  Sees Dr. Juan Fountain for Psychiatry who prescribes alprazolam 2 mg TID and velafaxine 75 mg and zolpidem 10 mg daily.  Continue venlafaxine. Resume PRN alprazolam 0.25 mg BID.

## 2018-01-27 NOTE — NURSING
Patient received to room 255 via stretcher.  Patient was able to transfer to bed from stretcher with minimal assist. Narcan gtt and Bicarb gtt infusing. Dutta draining to gravity per bundle protocol.

## 2018-01-27 NOTE — ASSESSMENT & PLAN NOTE
Improved to BUN 57, Cr 4.7.  Suspect 2/2 rhabdomyolysis.  CT abd/pelvis shows no renal abnormalities.  [facility] consulted and saw patient in ED. Switched IVF to sodium bicarbonate in D5. Avoid nephrotoxins and renally dose meds. Monitor.

## 2018-01-27 NOTE — ASSESSMENT & PLAN NOTE
Pt had fall down 5 steps onto concrete landing on 1/25/2018. States that she hit her head. Had CT in ED on 1/25 that showed no acute abnormalities.  Pt reports seeing and hearing things that were not there.  Boyfriend found her unresponsive on morning of 1/26 and she was altered on presentation to ED that day.  Thinking more clearly today.  D/C narcan infusion and move out of ICU.  Continue to monitor with neuro checks.

## 2018-01-27 NOTE — HOSPITAL COURSE
Patient alert and oriented on the day after admission.  [place]: [place] [address] > 82959 > 51345 >> 8443. Creatinine 6.2 > 4.7 > 2.7 > 2.0.   Suspect patient had concussion from initial fall on 1/25/18 which caused her hallucinations and 2 more falls with unresponsiveness leading to rhabdomyolysis.  She is eating, drinking, urinating and ambulating without difficulty. No complaints of muscle soreness.  Discharging with instruction to continue increased water consumption and f/u with PCP in 1 week for CMP and CK labs.  Pt also noted to have TSH 0.102 and Free T4 0.68.  She states she has been told in the past that something is wrong with her thyroid.  Sent Ambulatory Referral to Endocrinology for further work-up.

## 2018-01-27 NOTE — PLAN OF CARE
Patient arrived to  via stretcher with ICU nursing staff. Patient alert and oriented to self. Follows commands with prompting. Tele monitor applied as per orders. IVF infusing via IV site to right wrist via pump without difficulties. Verbalizes answers without difficulty. High fall risk protocol established. Bed alarm activated. Family at the bedside. For further information refer to admission assessment data sheets. Will cont to monitor patient and intervene prn.

## 2018-01-27 NOTE — SUBJECTIVE & OBJECTIVE
Interval History:     Review of Systems   Constitutional: Negative for chills and fever.   Respiratory: Negative for cough and shortness of breath.    Cardiovascular: Negative for chest pain and palpitations.   Gastrointestinal: Negative for nausea and vomiting.   Musculoskeletal: Positive for arthralgias, back pain and myalgias.   Neurological: Negative for dizziness and headaches.     Objective:     Vital Signs (Most Recent):  Temp: 98.6 °F (37 °C) (01/27/18 0715)  Pulse: 96 (01/27/18 1000)  Resp: 15 (01/27/18 1000)  BP: 115/76 (01/27/18 1000)  SpO2: 98 % (01/27/18 1000) Vital Signs (24h Range):  Temp:  [97.8 °F (36.6 °C)-98.6 °F (37 °C)] 98.6 °F (37 °C)  Pulse:  [] 96  Resp:  [] 15  SpO2:  [94 %-100 %] 98 %  BP: ()/(50-83) 115/76     Weight: 77 kg (169 lb 12.1 oz)  Body mass index is 33.15 kg/m².    Intake/Output Summary (Last 24 hours) at 01/27/18 1116  Last data filed at 01/27/18 1000   Gross per 24 hour   Intake           2016.5 ml   Output             5785 ml   Net          -3768.5 ml      Physical Exam   Constitutional: She is oriented to person, place, and time. No distress.   Cardiovascular: Normal rate and regular rhythm.    No murmur heard.  Pulmonary/Chest: Effort normal and breath sounds normal. No respiratory distress. She has no wheezes.   Abdominal: Soft. There is no tenderness.   Musculoskeletal: She exhibits no edema.   Neurological: She is alert and oriented to person, place, and time.   Skin:   No ecchymosis or abrasions on back where patient is hurting.    Psychiatric: She has a normal mood and affect. Thought content normal.   Nursing note and vitals reviewed.      Significant Labs:   CBC:   Recent Labs  Lab 01/26/18  0804 01/27/18  0445   WBC 8.29 8.75   HGB 9.0* 9.3*   HCT 27.3* 27.0*    205     CMP:   Recent Labs  Lab 01/26/18  0804 01/27/18  0445   * 141   K 4.1 3.5    104   CO2 15* 26   GLU 73 85   BUN 67* 57*   CREATININE 6.2* 4.7*   CALCIUM 7.4* 7.9*    PROT 5.5* 5.3*   ALBUMIN 2.6* 2.3*   BILITOT 0.4 0.3   ALKPHOS 65 64   * 452*   * 225*   ANIONGAP 13 11   EGFRNONAA 7* 10*     TSH:   Recent Labs  Lab 01/27/18  0445   TSH 0.102*     Free T4 0.68    CK 25,317    Significant Imaging: no new

## 2018-01-27 NOTE — PROGRESS NOTES
Patient hypotensive.  Sleeping, slight difficulty arousing. Able to sit up and talk to her son on the telephone but disoriented to time. Once patient awoke BP improved.

## 2018-01-27 NOTE — PLAN OF CARE
Problem: Patient Care Overview  Goal: Plan of Care Review  Outcome: Ongoing (interventions implemented as appropriate)  Patient remained free of falls/trauma/injuries overnight.   Mental status much improved this am compared to the beginning of the shift: she now awakens spontaneously, is oriented but states she does not remember the events of yesterday.   She also maintains she only took one percocet and gabapentin; denies this episode being a suicidal attempt.    Maintained on Bicarb gtts and Narcan gtts.   UOP 2.9 liters overnight, CPK much improved this am.   BP much improved.   PIVS x2 and saha maintained.   POC reviewed with patient, she verbalized understanding, needs continuous reinforcement.

## 2018-01-27 NOTE — ASSESSMENT & PLAN NOTE
"Drug screen positive for opioids and benzos. Pt is prescribed both and states, "I'm not a drug addict."  Admits to taking a pain pill from her neighbor after her fall on 1/26/2018.  Is prescribed alprazolam 2 mg and tramadol 50 mg and zolpidem 10 mg.  Will restart alprazolam 0.25 mg BID PRN today.      "

## 2018-01-27 NOTE — ASSESSMENT & PLAN NOTE
Elevated LFTs   CK 32,863 >> 99696 >> 25,317.  Pt had fall down 5 steps on 1/25/2018. Found on floor and minimally responsive on morning of 1/26/2018.  Drug screen positive for opioids and benzos which patient admits to taking.  Started on IVF which will be continued.  Elevated BUN, Cr, AST and ALT likely 2/2 rhabdo.  Monitor.

## 2018-01-27 NOTE — PROGRESS NOTES
Progress Note  [facility]    Admit Date: 1/26/2018   LOS: 1 day     SUBJECTIVE:     Follow-up For:  OLGA    Pleasant, no reported events overnight.  She is not having any chest pain, SOB, palp, HA, change in vision    OBJECTIVE:     Vital Signs (Most Recent)  Temp: 99.5 °F (37.5 °C) (01/27/18 1115)  Pulse: 107 (01/27/18 1215)  Resp: (!) 22 (01/27/18 1215)  BP: 127/80 (01/27/18 1215)  SpO2: 99 % (01/27/18 1215)    Vital Signs Range (Last 24H):  Temp:  [97.8 °F (36.6 °C)-99.5 °F (37.5 °C)]   Pulse:  []   Resp:  []   BP: ()/(50-80)   SpO2:  [94 %-100 %]     I & O (Last 24H):  Intake/Output Summary (Last 24 hours) at 01/27/18 1304  Last data filed at 01/27/18 1123   Gross per 24 hour   Intake           2016.5 ml   Output             6110 ml   Net          -4093.5 ml     Physical Exam:  NAD  No JVD  No murmurs  Lungs are clear  Soft +BS  Ext: no edema    Laboratory:  CBC:    Recent Labs  Lab 01/27/18  0445   WBC 8.75   RBC 3.31*   HGB 9.3*   HCT 27.0*        BMP:    Recent Labs  Lab 01/27/18  0445      K 3.5      CO2 26   BUN 57*   CREATININE 4.7*   CALCIUM 7.9*      CPK 25 K    ASSESSMENT/PLAN:     A 46 y/o female presents with alter mental status/encephalopathy d/t opiates overdose found to have OLGA etiology is consistent with Rhabdomyolysis CPK > 32K. Her labs also revealed anion gap metabolic acidosis     Plan:     1- stage 3 OLGA according to AKIN criteria.  -- etiologies d/t Rhabdomyolysis (from a fall and muscle lysis from a prolong time down) + ischemic ATN (hyporenal perfusion/hypotensive)  -- volume status: euvolemia  -- electrolytes: no hyperkalemia & no hyperphosphatemia  -- anion gap metabolic acidosis 16     -- decrease bicarb infusion at 75 ml/hr  -- continue  saha for one more day to monitor I/O  -- check UpH in am, goal is UpH >6.5  -- no need for mannitol at this point  -- no indication for renal replacement therapy now, I did talk to her about RRT and she agreed  to be on HD if needed.  -- daily CPK and RFP        Melissa Haynes MD  [facility] PGY-5

## 2018-01-27 NOTE — ASSESSMENT & PLAN NOTE
Pt states used to take lisinopril/HCTZ for BP but has not taken in awhile. Hypo to normotensive. Monitor.

## 2018-01-28 PROBLEM — E87.6 HYPOKALEMIA: Status: ACTIVE | Noted: 2018-01-28

## 2018-01-28 LAB
ALBUMIN SERPL BCP-MCNC: 2.2 G/DL
ALP SERPL-CCNC: 62 U/L
ALT SERPL W/O P-5'-P-CCNC: 224 U/L
ANION GAP SERPL CALC-SCNC: 12 MMOL/L
AST SERPL-CCNC: 372 U/L
BACTERIA #/AREA URNS HPF: ABNORMAL /HPF
BILIRUB SERPL-MCNC: 0.2 MG/DL
BILIRUB UR QL STRIP: NEGATIVE
BUN SERPL-MCNC: 35 MG/DL
CALCIUM SERPL-MCNC: 8.2 MG/DL
CHLORIDE SERPL-SCNC: 98 MMOL/L
CK SERPL-CCNC: ABNORMAL U/L
CLARITY UR: CLEAR
CO2 SERPL-SCNC: 34 MMOL/L
COLOR UR: YELLOW
CREAT SERPL-MCNC: 2.7 MG/DL
ERYTHROCYTE [DISTWIDTH] IN BLOOD BY AUTOMATED COUNT: 12.8 %
EST. GFR  (AFRICAN AMERICAN): 23 ML/MIN/1.73 M^2
EST. GFR  (NON AFRICAN AMERICAN): 20 ML/MIN/1.73 M^2
GLUCOSE SERPL-MCNC: 86 MG/DL
GLUCOSE UR QL STRIP: NEGATIVE
HCT VFR BLD AUTO: 28 %
HGB BLD-MCNC: 9.4 G/DL
HGB UR QL STRIP: ABNORMAL
KETONES UR QL STRIP: NEGATIVE
LEUKOCYTE ESTERASE UR QL STRIP: ABNORMAL
MAGNESIUM SERPL-MCNC: 1.1 MG/DL
MCH RBC QN AUTO: 27.8 PG
MCHC RBC AUTO-ENTMCNC: 33.6 G/DL
MCV RBC AUTO: 83 FL
MICROSCOPIC COMMENT: ABNORMAL
NITRITE UR QL STRIP: NEGATIVE
PH UR STRIP: 7 [PH] (ref 5–8)
PHOSPHATE SERPL-MCNC: 4.2 MG/DL
PLATELET # BLD AUTO: 237 K/UL
PMV BLD AUTO: 11.5 FL
POTASSIUM SERPL-SCNC: 3.2 MMOL/L
PROT SERPL-MCNC: 5.3 G/DL
PROT UR QL STRIP: ABNORMAL
RBC # BLD AUTO: 3.38 M/UL
RBC #/AREA URNS HPF: 10 /HPF (ref 0–4)
SODIUM SERPL-SCNC: 144 MMOL/L
SP GR UR STRIP: <=1.005 (ref 1–1.03)
SQUAMOUS #/AREA URNS HPF: 5 /HPF
URN SPEC COLLECT METH UR: ABNORMAL
UROBILINOGEN UR STRIP-ACNC: NEGATIVE EU/DL
WBC # BLD AUTO: 5.92 K/UL
WBC #/AREA URNS HPF: 10 /HPF (ref 0–5)

## 2018-01-28 PROCEDURE — 80053 COMPREHEN METABOLIC PANEL: CPT

## 2018-01-28 PROCEDURE — 82550 ASSAY OF CK (CPK): CPT

## 2018-01-28 PROCEDURE — 25000003 PHARM REV CODE 250: Performed by: PHYSICIAN ASSISTANT

## 2018-01-28 PROCEDURE — 25000003 PHARM REV CODE 250: Performed by: NURSE PRACTITIONER

## 2018-01-28 PROCEDURE — 84100 ASSAY OF PHOSPHORUS: CPT

## 2018-01-28 PROCEDURE — 83735 ASSAY OF MAGNESIUM: CPT

## 2018-01-28 PROCEDURE — 85027 COMPLETE CBC AUTOMATED: CPT

## 2018-01-28 PROCEDURE — 11000001 HC ACUTE MED/SURG PRIVATE ROOM

## 2018-01-28 PROCEDURE — 63600175 PHARM REV CODE 636 W HCPCS: Performed by: PHYSICIAN ASSISTANT

## 2018-01-28 PROCEDURE — 25000003 PHARM REV CODE 250: Performed by: HOSPITALIST

## 2018-01-28 PROCEDURE — 36415 COLL VENOUS BLD VENIPUNCTURE: CPT

## 2018-01-28 PROCEDURE — 25000003 PHARM REV CODE 250: Performed by: INTERNAL MEDICINE

## 2018-01-28 PROCEDURE — 81000 URINALYSIS NONAUTO W/SCOPE: CPT

## 2018-01-28 RX ORDER — AMOXICILLIN 250 MG
1 CAPSULE ORAL 2 TIMES DAILY
Status: DISCONTINUED | OUTPATIENT
Start: 2018-01-28 | End: 2018-01-29 | Stop reason: HOSPADM

## 2018-01-28 RX ORDER — ACETAMINOPHEN 325 MG/1
650 TABLET ORAL EVERY 4 HOURS PRN
Status: DISCONTINUED | OUTPATIENT
Start: 2018-01-28 | End: 2018-01-29 | Stop reason: HOSPADM

## 2018-01-28 RX ORDER — POLYETHYLENE GLYCOL 3350 17 G/17G
17 POWDER, FOR SOLUTION ORAL DAILY
Status: DISCONTINUED | OUTPATIENT
Start: 2018-01-28 | End: 2018-01-29 | Stop reason: HOSPADM

## 2018-01-28 RX ORDER — MAGNESIUM SULFATE HEPTAHYDRATE 40 MG/ML
2 INJECTION, SOLUTION INTRAVENOUS
Status: DISPENSED | OUTPATIENT
Start: 2018-01-28 | End: 2018-01-28

## 2018-01-28 RX ORDER — POTASSIUM CHLORIDE 20 MEQ/1
40 TABLET, EXTENDED RELEASE ORAL ONCE
Status: COMPLETED | OUTPATIENT
Start: 2018-01-28 | End: 2018-01-28

## 2018-01-28 RX ADMIN — ALPRAZOLAM 0.25 MG: 0.25 TABLET ORAL at 11:01

## 2018-01-28 RX ADMIN — SODIUM BICARBONATE: 84 INJECTION, SOLUTION INTRAVENOUS at 08:01

## 2018-01-28 RX ADMIN — VENLAFAXINE HYDROCHLORIDE 75 MG: 75 CAPSULE, EXTENDED RELEASE ORAL at 09:01

## 2018-01-28 RX ADMIN — POLYETHYLENE GLYCOL 3350 17 G: 17 POWDER, FOR SOLUTION ORAL at 09:01

## 2018-01-28 RX ADMIN — ONDANSETRON 8 MG: 8 TABLET, ORALLY DISINTEGRATING ORAL at 12:01

## 2018-01-28 RX ADMIN — MAGNESIUM SULFATE IN WATER 2 G: 40 INJECTION, SOLUTION INTRAVENOUS at 11:01

## 2018-01-28 RX ADMIN — STANDARDIZED SENNA CONCENTRATE AND DOCUSATE SODIUM 1 TABLET: 8.6; 5 TABLET, FILM COATED ORAL at 08:01

## 2018-01-28 RX ADMIN — STANDARDIZED SENNA CONCENTRATE AND DOCUSATE SODIUM 1 TABLET: 8.6; 5 TABLET, FILM COATED ORAL at 09:01

## 2018-01-28 RX ADMIN — HEPARIN SODIUM 5000 UNITS: 5000 INJECTION, SOLUTION INTRAVENOUS; SUBCUTANEOUS at 08:01

## 2018-01-28 RX ADMIN — ALPRAZOLAM 0.25 MG: 0.25 TABLET ORAL at 12:01

## 2018-01-28 RX ADMIN — HEPARIN SODIUM 5000 UNITS: 5000 INJECTION, SOLUTION INTRAVENOUS; SUBCUTANEOUS at 09:01

## 2018-01-28 RX ADMIN — POTASSIUM CHLORIDE 40 MEQ: 20 TABLET, EXTENDED RELEASE ORAL at 05:01

## 2018-01-28 RX ADMIN — ACETAMINOPHEN 650 MG: 325 TABLET ORAL at 11:01

## 2018-01-28 RX ADMIN — LUBIPROSTONE 8 MCG: 8 CAPSULE, GELATIN COATED ORAL at 09:01

## 2018-01-28 RX ADMIN — SODIUM BICARBONATE: 84 INJECTION, SOLUTION INTRAVENOUS at 04:01

## 2018-01-28 RX ADMIN — MAGNESIUM SULFATE IN WATER 2 G: 40 INJECTION, SOLUTION INTRAVENOUS at 09:01

## 2018-01-28 NOTE — ASSESSMENT & PLAN NOTE
C/O headache this morning.  No vision changes. No hallucinations today. Pt had fall down 5 steps onto concrete landing on 1/25/2018. States that she hit her head. Had CT in ED on 1/25 that showed no acute abnormalities.  Pt reports seeing and hearing things that were not there.  Boyfriend found her unresponsive on morning of 1/26 and she was altered on presentation to ED that day.  Continue to monitor with neuro checks.

## 2018-01-28 NOTE — PROGRESS NOTES
Progress Note  [facility]    Admit Date: 1/26/2018   LOS: 2 days     SUBJECTIVE:     Follow-up For:  OLGA    Pleasant, no reported events overnight.  She is not having any chest pain, SOB, palp, HA, change in vision    OBJECTIVE:     Vital Signs (Most Recent)  Temp: 98.6 °F (37 °C) (01/28/18 1143)  Pulse: 105 (01/28/18 1147)  Resp: 18 (01/28/18 1143)  BP: 107/71 (01/28/18 1143)  SpO2: 97 % (01/28/18 0441)    Vital Signs Range (Last 24H):  Temp:  [98 °F (36.7 °C)-99.4 °F (37.4 °C)]   Pulse:  []   Resp:  [10-20]   BP: (103-118)/(60-82)   SpO2:  [97 %-100 %]     I & O (Last 24H):    Intake/Output Summary (Last 24 hours) at 01/28/18 1437  Last data filed at 01/28/18 0500   Gross per 24 hour   Intake              750 ml   Output             1875 ml   Net            -1125 ml     Physical Exam:  NAD  No JVD  No murmurs  Lungs are clear  Soft +BS  Ext: no edema    Laboratory:  CBC:    Recent Labs  Lab 01/28/18  0601   WBC 5.92   RBC 3.38*   HGB 9.4*   HCT 28.0*        BMP:    Recent Labs  Lab 01/28/18  0601      K 3.2*   CL 98   CO2 34*   BUN 35*   CREATININE 2.7*   CALCIUM 8.2*      CPK 25 K    ASSESSMENT/PLAN:     A 48 y/o female presents with alter mental status/encephalopathy d/t opiates overdose found to have OLGA etiology is consistent with Rhabdomyolysis CPK > 32K. Her labs also revealed anion gap metabolic acidosis     Plan:     1- stage 3 OLGA according to AKIN criteria.  -- etiologies d/t Rhabdomyolysis (from a fall and muscle lysis from a prolong time down) + ischemic ATN (hyporenal perfusion/hypotensive)  -- volume status: euvolemia  -- electrolytes: no hyperkalemia & no hyperphosphatemia  -- anion gap metabolic acidosis 16 resolved     -- continue bicarb infusion at 75 ml/hr  -- continue  saha for one more day to monitor I/O  -- check UpH in am, goal is UpH >6.5  -- no need for mannitol at this point.  -- daily CPK and RFP        Melissa Haynes MD  [facility] PGY-5

## 2018-01-28 NOTE — PROGRESS NOTES
"[facility] Hospital Medicine  Progress Note    Patient Name: Shanthi Brewer  MRN: [id number]  Patient Class: IP- Inpatient   Admission Date: 1/26/2018  Length of Stay: 2 days  Attending Physician: Curtis Quinones MD  Primary Care Provider: Gilda Aburto MD        Subjective:     Principal Problem:Non-traumatic rhabdomyolysis    HPI:  46 yo female with HTN, anxiety and depression and bipolar disorder presents to [facility] ED on 1/26/18 after been found on the floor with decreased level of consciousness by her boyfriend, Ramiro Wiggins.  Pt reports that she fell down some stairs yesterday after she tripped over her dog.  She went to [facility] ED where head CT and L-spine x-ray were done showing no acute abnormalities.  She was given IM hydromorphone and was discharged home.  Pt reported then that she was seeing and hearing people that were not present.  She admits to taking ibuprofen, gabapentin, tramadol and a "pain pill" she got from her neighbor yesterday.  Pt states, "I'm not a drug addict."  She currently c/o back pain, epigastric pain and dyspnea.  Denies fever, HA, CP, N/V/D/C.  PCP Dr. Gilda Aburto. Psychiatrist Dr. Fountain.      Prescription Monitoring Program report reveals pt is prescribed Tramadol 50 mg BID and was prescribed #30 on 1/22/18.  She was prescribed zolpidem tartrate 10 mg nightly, #30 on 1/22/18 and alprazolam 2 mg TID on 1/2/2018. She states she drinks alcohol occasionally, none in the past few days. Denies use of tobacco or illicit drugs.  Mother alive with DM; father alive with mental health issues.  She has numerous brothers and sisters who are healthy per patient. She has 2 sons.    Work-up in ED: Pt initially hypotensive, improved with IVF.  Creatinine 6.2, BUN 67, CK 32,863, , . Drug screen positive for benzos and opioids.  US Abdomen done for elevated LFTs: prominent common duct, noting that it was same dilated size on 7/25/2014.  CT abd/pelvis: " gallbladder distention and mild gallbladder wall thickening. Trace ascites over hepatic dome.  Pt intermittently unresponsive in ED, improved with narcan infusion.  Admitting to [facility].  [facility] was consulted in ED.      Hospital Course:  Patient alert and oriented today.  [place] [address] > 21997 > 11870. Creatinine 6.2 > 4.7 > 2.7.   Suspect patient had concussion from initial fall on 1/25/18 which caused her hallucinations and 2 more falls with unresponsiveness leading to rhabdomyolysis.      Interval History: Pt c/o frontal and temporal headache this morning. Denies CP, SOB. States she has chronic neck pain from herniated discs for which she was prescribed lortab and percocet in the past.  Now prescribed tramadol by her PCP.     Review of Systems   Constitutional: Negative for chills and fever.   Respiratory: Negative for cough and shortness of breath.    Cardiovascular: Negative for chest pain and palpitations.   Gastrointestinal: Negative for nausea and vomiting.   Neurological: Positive for headaches. Negative for dizziness.     Objective:     Vital Signs (Most Recent):  Temp: 98.6 °F (37 °C) (01/28/18 1143)  Pulse: 104 (01/28/18 1143)  Resp: 18 (01/28/18 1143)  BP: 107/71 (01/28/18 1143)  SpO2: 97 % (01/28/18 0441) Vital Signs (24h Range):  Temp:  [98 °F (36.7 °C)-99.4 °F (37.4 °C)] 98.6 °F (37 °C)  Pulse:  [] 104  Resp:  [10-36] 18  SpO2:  [97 %-100 %] 97 %  BP: (103-127)/(60-82) 107/71     Weight: 77 kg (169 lb 12.1 oz)  Body mass index is 33.15 kg/m².    Intake/Output Summary (Last 24 hours) at 01/28/18 1147  Last data filed at 01/28/18 0500   Gross per 24 hour   Intake              750 ml   Output             2100 ml   Net            -1350 ml      Physical Exam   Constitutional: She is oriented to person, place, and time. No distress.   Eyes: Conjunctivae and EOM are normal. Pupils are equal, round, and reactive to light. No scleral icterus.    Cardiovascular: Normal rate and regular rhythm.    No murmur heard.  Pulmonary/Chest: Effort normal and breath sounds normal. No respiratory distress. She has no wheezes.   Abdominal: Soft. There is no tenderness.   Musculoskeletal: She exhibits no edema.   Neurological: She is alert and oriented to person, place, and time.   Psychiatric: She has a normal mood and affect. Thought content normal.   Nursing note and vitals reviewed.      Significant Labs:   CBC:   Recent Labs  Lab 01/27/18  0445 01/28/18  0601   WBC 8.75 5.92   HGB 9.3* 9.4*   HCT 27.0* 28.0*    237     CMP:   Recent Labs  Lab 01/27/18  0445 01/28/18  0601    144   K 3.5 3.2*    98   CO2 26 34*   GLU 85 86   BUN 57* 35*   CREATININE 4.7* 2.7*   CALCIUM 7.9* 8.2*   PROT 5.3* 5.3*   ALBUMIN 2.3* 2.2*   BILITOT 0.3 0.2   ALKPHOS 64 62   * 372*   * 224*   ANIONGAP 11 12   EGFRNONAA 10* 20*     CK 37888    Significant Imaging:  No new    Assessment/Plan:      * Non-traumatic rhabdomyolysis    Elevated LFTs   CK 32,863 >> 60241 >> 25,317 >> 10012.  Pt had fall down 5 steps on 1/25/2018. Found on floor and minimally responsive on morning of 1/26/2018.  Drug screen positive for opioids and benzos which patient takes chronically.  Started on IVF which will be continued.  Elevated BUN, Cr, AST and ALT likely 2/2 rhabdo.  Monitor.           Acute renal failure    Improved to BUN 35, Cr 2.7.  Suspect 2/2 rhabdomyolysis.  CT abd/pelvis shows no renal abnormalities.  [facility] consulted and saw patient in ED. Switched IVF to sodium bicarbonate in D5. Avoid nephrotoxins and renally dose meds. Monitor.           Concussion with loss of consciousness of 30 minutes or less    C/O headache this morning.  No vision changes. No hallucinations today. Pt had fall down 5 steps onto concrete landing on 1/25/2018. States that she hit her head. Had CT in ED on 1/25 that showed no acute abnormalities.  Pt reports seeing and hearing things  "that were not there.  Boyfriend found her unresponsive on morning of 1/26 and she was altered on presentation to ED that day.  Continue to monitor with neuro checks.           Polysubstance abuse    Drug screen positive for opioids and benzos. Pt is prescribed both and states, "I'm not a drug addict."   confirms prescriptions.  Admits to taking a pain pill from her neighbor after her fall on 1/26/2018.  Is prescribed alprazolam 2 mg and tramadol 50 mg and zolpidem 10 mg.  Continue alprazolam 0.25 mg BID PRN and tramadol 50 mg BID.         Normocytic anemia    Hgb 9.4.  No visible, active bleeding. Monitor.           Essential hypertension    -127.  Pt states used to take lisinopril/HCTZ for BP but has not taken in awhile. Monitor.           Bipolar 1 disorder    Anxiety  Pt is calm and cooperative at present.  Sees Dr. Juan Fountain for Psychiatry who prescribes alprazolam 2 mg TID and velafaxine 75 mg and zolpidem 10 mg daily.  Continue venlafaxine and PRN alprazolam 0.25 mg BID.            Hypomagnesemia    Hypokalemia   Mg 1.1 this morning.  Replace IV. K 3.2. Replace PO after Mg replacement. Monitor.             VTE Risk Mitigation         Ordered     heparin (porcine) injection 5,000 Units  Every 12 hours     Route:  Subcutaneous        01/26/18 1638     Medium Risk of VTE  Once      01/26/18 1619              Lesly Collins PA-C  Department of Hospital Medicine   [facility]  Pager: [phone]    "

## 2018-01-28 NOTE — ASSESSMENT & PLAN NOTE
Elevated LFTs   CK 32,863 >> 72030 >> 25,317 >> 81751.  Pt had fall down 5 steps on 1/25/2018. Found on floor and minimally responsive on morning of 1/26/2018.  Drug screen positive for opioids and benzos which patient takes chronically.  Started on IVF which will be continued.  Elevated BUN, Cr, AST and ALT likely 2/2 rhabdo.  Monitor.

## 2018-01-28 NOTE — ASSESSMENT & PLAN NOTE
Anxiety  Pt is calm and cooperative at present.  Sees Dr. Juan Fountain for Psychiatry who prescribes alprazolam 2 mg TID and velafaxine 75 mg and zolpidem 10 mg daily.  Continue venlafaxine and PRN alprazolam 0.25 mg BID.

## 2018-01-28 NOTE — ASSESSMENT & PLAN NOTE
"Drug screen positive for opioids and benzos. Pt is prescribed both and states, "I'm not a drug addict."   confirms prescriptions.  Admits to taking a pain pill from her neighbor after her fall on 1/26/2018.  Is prescribed alprazolam 2 mg and tramadol 50 mg and zolpidem 10 mg.  Continue alprazolam 0.25 mg BID PRN and tramadol 50 mg BID.   "

## 2018-01-28 NOTE — ASSESSMENT & PLAN NOTE
Hypokalemia   Mg 1.1 this morning.  Replace IV. K 3.2. Replace PO after Mg replacement. Monitor.

## 2018-01-28 NOTE — ASSESSMENT & PLAN NOTE
Improved to BUN 35, Cr 2.7.  Suspect 2/2 rhabdomyolysis.  CT abd/pelvis shows no renal abnormalities.  [facility] consulted and saw patient in ED. Switched IVF to sodium bicarbonate in D5. Avoid nephrotoxins and renally dose meds. Monitor.

## 2018-01-28 NOTE — SUBJECTIVE & OBJECTIVE
Interval History: Pt c/o frontal and temporal headache this morning. Denies CP, SOB. States she has chronic neck pain from herniated discs for which she was prescribed lortab and percocet in the past.  Now prescribed tramadol by her PCP.     Review of Systems   Constitutional: Negative for chills and fever.   Respiratory: Negative for cough and shortness of breath.    Cardiovascular: Negative for chest pain and palpitations.   Gastrointestinal: Negative for nausea and vomiting.   Neurological: Positive for headaches. Negative for dizziness.     Objective:     Vital Signs (Most Recent):  Temp: 98.6 °F (37 °C) (01/28/18 1143)  Pulse: 104 (01/28/18 1143)  Resp: 18 (01/28/18 1143)  BP: 107/71 (01/28/18 1143)  SpO2: 97 % (01/28/18 0441) Vital Signs (24h Range):  Temp:  [98 °F (36.7 °C)-99.4 °F (37.4 °C)] 98.6 °F (37 °C)  Pulse:  [] 104  Resp:  [10-36] 18  SpO2:  [97 %-100 %] 97 %  BP: (103-127)/(60-82) 107/71     Weight: 77 kg (169 lb 12.1 oz)  Body mass index is 33.15 kg/m².    Intake/Output Summary (Last 24 hours) at 01/28/18 1147  Last data filed at 01/28/18 0500   Gross per 24 hour   Intake              750 ml   Output             2100 ml   Net            -1350 ml      Physical Exam   Constitutional: She is oriented to person, place, and time. No distress.   Eyes: Conjunctivae and EOM are normal. Pupils are equal, round, and reactive to light. No scleral icterus.   Cardiovascular: Normal rate and regular rhythm.    No murmur heard.  Pulmonary/Chest: Effort normal and breath sounds normal. No respiratory distress. She has no wheezes.   Abdominal: Soft. There is no tenderness.   Musculoskeletal: She exhibits no edema.   Neurological: She is alert and oriented to person, place, and time.   Psychiatric: She has a normal mood and affect. Thought content normal.   Nursing note and vitals reviewed.      Significant Labs:   CBC:   Recent Labs  Lab 01/27/18  0445 01/28/18  0601   WBC 8.75 5.92   HGB 9.3* 9.4*   HCT  27.0* 28.0*    237     CMP:   Recent Labs  Lab 01/27/18  0445 01/28/18  0601    144   K 3.5 3.2*    98   CO2 26 34*   GLU 85 86   BUN 57* 35*   CREATININE 4.7* 2.7*   CALCIUM 7.9* 8.2*   PROT 5.3* 5.3*   ALBUMIN 2.3* 2.2*   BILITOT 0.3 0.2   ALKPHOS 64 62   * 372*   * 224*   ANIONGAP 11 12   EGFRNONAA 10* 20*     CK 55222    Significant Imaging:  No new

## 2018-01-28 NOTE — PLAN OF CARE
Problem: Patient Care Overview  Goal: Plan of Care Review  Outcome: Ongoing (interventions implemented as appropriate)  Plan of care reviewed with patient. Patient verbalized complete understanding. Patient's CPK is improving at 97910.  Fall precautions maintained. Bed in lowest position, locked, call light within reach, and bed alarm on. Side rails up x's 2 with slip resistant socks on. Nurse instructed patient to notify staff for any assistance and pt verbalized complete understanding. Patient on telemetry throughout shift with no ectopy noted. Will continue to monitor.

## 2018-01-29 VITALS
DIASTOLIC BLOOD PRESSURE: 74 MMHG | TEMPERATURE: 99 F | RESPIRATION RATE: 20 BRPM | WEIGHT: 169.75 LBS | HEART RATE: 83 BPM | HEIGHT: 60 IN | OXYGEN SATURATION: 98 % | BODY MASS INDEX: 33.33 KG/M2 | SYSTOLIC BLOOD PRESSURE: 126 MMHG

## 2018-01-29 PROBLEM — E87.6 HYPOKALEMIA: Status: RESOLVED | Noted: 2018-01-28 | Resolved: 2018-01-29

## 2018-01-29 PROBLEM — E83.42 HYPOMAGNESEMIA: Status: RESOLVED | Noted: 2018-01-27 | Resolved: 2018-01-29

## 2018-01-29 LAB
ALBUMIN SERPL BCP-MCNC: 2.3 G/DL
ALP SERPL-CCNC: 56 U/L
ALT SERPL W/O P-5'-P-CCNC: 199 U/L
ANION GAP SERPL CALC-SCNC: 7 MMOL/L
AST SERPL-CCNC: 247 U/L
BILIRUB SERPL-MCNC: 0.2 MG/DL
BUN SERPL-MCNC: 22 MG/DL
CALCIUM SERPL-MCNC: 8.8 MG/DL
CHLORIDE SERPL-SCNC: 99 MMOL/L
CK SERPL-CCNC: 8443 U/L
CO2 SERPL-SCNC: 34 MMOL/L
CREAT SERPL-MCNC: 2 MG/DL
EST. GFR  (AFRICAN AMERICAN): 33 ML/MIN/1.73 M^2
EST. GFR  (NON AFRICAN AMERICAN): 29 ML/MIN/1.73 M^2
GLUCOSE SERPL-MCNC: 97 MG/DL
MAGNESIUM SERPL-MCNC: 1.8 MG/DL
PHOSPHATE SERPL-MCNC: 3.4 MG/DL
POTASSIUM SERPL-SCNC: 3.6 MMOL/L
PROT SERPL-MCNC: 5.6 G/DL
SODIUM SERPL-SCNC: 140 MMOL/L

## 2018-01-29 PROCEDURE — 82550 ASSAY OF CK (CPK): CPT

## 2018-01-29 PROCEDURE — 83735 ASSAY OF MAGNESIUM: CPT

## 2018-01-29 PROCEDURE — 80053 COMPREHEN METABOLIC PANEL: CPT

## 2018-01-29 PROCEDURE — 84100 ASSAY OF PHOSPHORUS: CPT

## 2018-01-29 PROCEDURE — 63600175 PHARM REV CODE 636 W HCPCS: Performed by: PHYSICIAN ASSISTANT

## 2018-01-29 PROCEDURE — 36415 COLL VENOUS BLD VENIPUNCTURE: CPT

## 2018-01-29 PROCEDURE — 25000003 PHARM REV CODE 250: Performed by: PHYSICIAN ASSISTANT

## 2018-01-29 PROCEDURE — 94761 N-INVAS EAR/PLS OXIMETRY MLT: CPT

## 2018-01-29 RX ADMIN — LUBIPROSTONE 8 MCG: 8 CAPSULE, GELATIN COATED ORAL at 08:01

## 2018-01-29 RX ADMIN — VENLAFAXINE HYDROCHLORIDE 75 MG: 75 CAPSULE, EXTENDED RELEASE ORAL at 08:01

## 2018-01-29 RX ADMIN — HEPARIN SODIUM 5000 UNITS: 5000 INJECTION, SOLUTION INTRAVENOUS; SUBCUTANEOUS at 08:01

## 2018-01-29 RX ADMIN — POLYETHYLENE GLYCOL 3350 17 G: 17 POWDER, FOR SOLUTION ORAL at 08:01

## 2018-01-29 RX ADMIN — STANDARDIZED SENNA CONCENTRATE AND DOCUSATE SODIUM 1 TABLET: 8.6; 5 TABLET, FILM COATED ORAL at 08:01

## 2018-01-29 NOTE — NURSING
Discharge instructions and education provided. Patient voices understanding. IV site and telemetry discontinued without adverse reaction. Patient waiting for transportation.

## 2018-01-29 NOTE — ASSESSMENT & PLAN NOTE
-127.  Pt states used to take lisinopril/HCTZ for BP but has not taken in awhile. Does not need to resume it at discharge.

## 2018-01-29 NOTE — ASSESSMENT & PLAN NOTE
Anxiety  Pt has been calm and cooperative throughout.  Sees Dr. Juan Fountain for Psychiatry who prescribes alprazolam 2 mg TID and velafaxine 75 mg and zolpidem 10 mg daily which may be resumed at discharge.

## 2018-01-29 NOTE — PLAN OF CARE
No dme or hh needed.       01/29/18 1228   Final Note   Assessment Type Final Discharge Note   Discharge Disposition Home   Hospital Follow Up  Appt(s) scheduled? No  (Patient stated she wants to make her own followups)   Discharge plans and expectations educations in teach back method with documentation complete? Yes   Right Care Referral Info   Post Acute Recommendation No Care     Ivelisse Alberts, RN, CCM, CMSRN  RN Transition Navigator  [phone]

## 2018-01-29 NOTE — DISCHARGE SUMMARY
"[facility] Medicine  Discharge Summary      Patient Name: Shanthi Brewer  MRN: [id number]  Admission Date: 1/26/2018  Hospital Length of Stay: 3 days  Discharge Date and Time:  01/29/2018 9:43 AM  Attending Physician: Curtis Quinones MD   Discharging Provider: Lesly Collins PA-C  Primary Care Provider: Gilda Aburto MD      HPI:   46 yo female with HTN, anxiety and depression and bipolar disorder presented to [facility] ED on 1/26/18 after been found on the floor with decreased level of consciousness by her boyfriend, Ramiro Wiggins.  Pt reports that she fell down some stairs on 1/25/18 after she tripped over her dog.  She went to [facility] ED where head CT and L-spine x-ray were done showing no acute abnormalities.  She was given IM hydromorphone and was discharged home.  Pt reported then that she was seeing and hearing people that were not present.  She admits to taking ibuprofen, gabapentin, tramadol and a "pain pill" she got from her neighbor yesterday.  Pt states, "I'm not a drug addict."  PCP Dr. Gilda Aburto. Psychiatrist Dr. Fountain.      Prescription Monitoring Program report reveals pt is prescribed Tramadol 50 mg BID and was prescribed #30 on 1/22/18.  She was prescribed zolpidem tartrate 10 mg nightly, #30 on 1/22/18 and alprazolam 2 mg TID on 1/2/2018. She states she drinks alcohol occasionally, none in the past few days. Denies use of tobacco or illicit drugs.      Work-up in ED: Pt initially hypotensive, improved with IVF.  Creatinine 6.2, BUN 67, CK 32,863, , . Drug screen positive for benzos and opioids.  US Abdomen done for elevated LFTs: prominent common duct, noting that it was same dilated size on 7/25/2014.  CT abd/pelvis: gallbladder distention and mild gallbladder wall thickening. Trace ascites over hepatic dome.  Pt intermittently unresponsive in ED, improved with narcan infusion.  Admitted to [facility].  [facility] was " consulted in ED.      * No surgery found *      Hospital Course:   Patient alert and oriented on the day after admission.  [place] 332[address] > 21534 > 86792 >> 8443. Creatinine 6.2 > 4.7 > 2.7 > 2.0.   Suspect patient had concussion from initial fall on 1/25/18 which caused her hallucinations and 2 more falls with unresponsiveness leading to rhabdomyolysis.  She is eating, drinking, urinating and ambulating without difficulty. No complaints of muscle soreness.  Discharging with instruction to continue increased water consumption and f/u with PCP in 1 week for CMP and CK labs.  Pt also noted to have TSH 0.102 and Free T4 0.68.  She states she has been told in the past that something is wrong with her thyroid.  Sent Ambulatory Referral to Endocrinology for further work-up.       Consults:   Consults         Status Ordering Provider     Inpatient consult to [facility]  Once     Provider:  MD Christine Oconnell RENEE R.        Physical Exam   Constitutional: She is oriented to person, place, and time. No distress.   Eyes: Conjunctivae and EOM are normal. Pupils are equal, round, and reactive to light. No scleral icterus.   Cardiovascular: Normal rate and regular rhythm.    No murmur heard.  Pulmonary/Chest: Effort normal and breath sounds normal. No respiratory distress. She has no wheezes.   Abdominal: Soft. There is no tenderness.   Musculoskeletal: She exhibits no edema.   Neurological: She is alert and oriented to person, place, and time. No focal neurological deficits.    Psychiatric: She has a normal mood and affect. Thought content normal.   Nursing note and vitals reviewed.    * Non-traumatic rhabdomyolysis    Elevated LFTs   CK 31323 >> 15718 >> 83091 >> 90687.  Pt had fall down 5 steps on 1/25/2018. Found on floor and minimally responsive on morning of 1/26/2018.  Drug screen positive for opioids and benzos which patient takes chronically.   confirms  prescriptions.  Admits to taking a pain pill from her neighbor after her fall on 1/26/2018.  Is prescribed alprazolam 2 mg and tramadol 50 mg and zolpidem 10 mg PRN nightly. Received IVF with good improvement.  Elevated BUN, Cr, AST and ALT likely 2/2 rhabdo - all improving.  Encouraged good PO fluid intake. F/U PCP in 1-2 weeks for repeat labs. Pt is eating, drinking, urinating and ambulating well.            Acute renal failure    Improved to BUN 22, Cr 2.0.  Suspect 2/2 rhabdomyolysis.  CT abd/pelvis shows no renal abnormalities.  [facility] consulted and saw patient in ED. Repeat labs at PCP follow-up appt.         Concussion with loss of consciousness of 30 minutes or less    Pt had fall down 5 steps onto concrete landing on 1/25/2018. States that she hit her head. Had CT in ED on 1/25 that showed no acute abnormalities.  Pt reports seeing and hearing things that were not there.  Boyfriend found her unresponsive on morning of 1/26 and she was altered on presentation to ED that day.  No neuro deficits during hospitalization. Pt ambulating without difficulty.  Safe for discharge home.            Normocytic anemia    Hgb 9.4.  No visible, active bleeding.            Essential hypertension    -127.  Pt states used to take lisinopril/HCTZ for BP but has not taken in awhile. Does not need to resume it at discharge.           Bipolar 1 disorder    Anxiety  Pt has been calm and cooperative throughout.  Sees Dr. Juan Fountain for Psychiatry who prescribes alprazolam 2 mg TID and velafaxine 75 mg and zolpidem 10 mg daily which may be resumed at discharge.              Final Active Diagnoses:    Diagnosis Date Noted POA    PRINCIPAL PROBLEM:  Non-traumatic rhabdomyolysis [M62.82] 01/26/2018 Yes    Acute renal failure [N17.9] 01/26/2018 Yes    Concussion with loss of consciousness of 30 minutes or less [S06.0X1A] 01/27/2018 Yes    Normocytic anemia [D64.9] 05/08/2015 Yes     Chronic    Essential hypertension  [I10] 05/08/2015 Yes     Chronic    Bipolar 1 disorder [F31.9] 01/26/2018 Yes     Chronic    Anxiety [F41.9] 01/26/2018 Yes     Chronic    Elevated LFTs [R79.89] 01/26/2018 Yes      Problems Resolved During this Admission:    Diagnosis Date Noted Date Resolved POA    Hypomagnesemia [E83.42] 01/27/2018 01/29/2018 Yes    Hypokalemia [E87.6] 01/28/2018 01/29/2018 Yes       Discharged Condition: stable    Disposition: Home or Self Care    Follow Up:  Follow-up Information     Gilda Aburto MD In 1 week.    Specialty:  Internal Medicine  Why:  hospital follow-up  Contact information:  [address]  [address]  [facility] [place] [address]  [phone]                 Patient Instructions:     Ambulatory Referral to Endocrinology   Referral Priority: Routine Referral Type: Consultation   Requested Specialty: Endocrinology    Number of Visits Requested: 1      Activity as tolerated     Notify your health care provider if you experience any of the following:  severe uncontrolled pain     Notify your health care provider if you experience any of the following:  difficulty breathing or increased cough     Notify your health care provider if you experience any of the following:  persistent dizziness, light-headedness, or visual disturbances     Notify your health care provider if you experience any of the following:  increased confusion or weakness         Significant Diagnostic Studies: Labs:   CMP   Recent Labs  Lab 01/28/18  0601 01/29/18  0457    140   K 3.2* 3.6   CL 98 99   CO2 34* 34*   GLU 86 97   BUN 35* 22*   CREATININE 2.7* 2.0*   CALCIUM 8.2* 8.8   PROT 5.3* 5.6*   ALBUMIN 2.2* 2.3*   BILITOT 0.2 0.2   ALKPHOS 62 56   * 247*   * 199*   ANIONGAP 12 7*   ESTGFRAFRICA 23* 33*   EGFRNONAA 20* 29*   , CBC   Recent Labs  Lab 01/28/18  0601   WBC 5.92   HGB 9.4*   HCT 28.0*         TSH 0.102  Free T4 0.68    Imaging Results          US Abdomen Limited (Final result)  Result  time 01/26/18 13:04:35    Final result by Christopher Grigsby MD (01/26/18 13:04:35)                 Impression:      1.  Prominent common duct, nonspecific noting the common duct measured 0.6-0.7 cm on CT 7/25/2014, and may be a chronic finding.  Correlation with laboratory values, ERCP if warranted.        Electronically signed by: CHRISTOPHER GRIGSBY MD  Date:     01/26/18  Time:    13:04              Narrative:    Ultrasound abdomen limited    Clinical history: Transaminitis    Comparison: CT 1/26/2018    Technique:  Real-time sonography was performed of the abdomen using transabdominal technique.    Findings:    The pancreas is obscured by overlying structures.  The gallbladder is unremarkable without wall thickening or cholelithiasis.  Sonographic Rainey's sign is negative.  The common duct is prominent measuring up to 0.7 cm.  The hepatic parenchyma is homogeneous, the liver is not enlarged.  The right kidney is unremarkable.  No ascites.                             X-Ray Chest AP Portable (Final result)  Result time 01/26/18 11:10:14    Final result by Ashwin Hurtado MD (01/26/18 11:10:14)                 Impression:     Stable appearance of the chest.      Electronically signed by: ASHWIN HURTADO MD  Date:     01/26/18  Time:    11:10              Narrative:    Chest pain.    Comparison: 1/26/18.    Single frontal view of the chest was obtained.    Trachea is patent the hepatic silhouette appears normal in size. The overall appearance of the lungs is similar to the study from earlier the same day. Postsurgical change the right humerus noted. No effusion, pneumothorax or free air below the diaphragm. Also similar to before.                             CT Abdomen Pelvis  Without Contrast (Final result)  Result time 01/26/18 11:03:29    Final result by Vlad Bhandari MD (01/26/18 11:03:29)                 Impression:      1. Gallbladder distention and mild gallbladder wall thickening. No pericholecystic  fluid or inflammatory change. Findings possibly from hepatitis or chronic cholecystitis.    2. Trace ascites over the hepatic dome.      Electronically signed by: GLADYS HOWARD MD  Date:     01/26/18  Time:    11:03              Narrative:    CT abdomen and pelvis without contrast    Technique: Helical CT images.    Comparison: July 25, 2014 and November 24, 2013    Findings:  Both lung bases demonstrate dependent atelectasis.    The gallbladder is distended, and demonstrates mild wall thickening. No pericholecystic inflammatory change.    The intracardiac blood is low density compared to the left ventricular myocardium, suspicious for anemia.    Trace ascites over the hepatic dome.    The liver, spleen, pancreas, and adrenals are unremarkable.    No bowel wall thickening or dilation. Normal appendix.    The kidneys and ureters are unremarkable. Urinary bladder is unremarkable.    No adenopathy.    Hysterectomy.    No acute bone abnormality.    Body wall soft tissues are unremarkable.                             X-Ray Chest AP Portable (Final result)  Result time 01/26/18 09:47:53    Final result by Kevin Queen MD (01/26/18 09:47:53)                 Impression:     No acute cardiopulmonary process.      Electronically signed by: KEVIN QUEEN MD  Date:     01/26/18  Time:    09:47              Narrative:    Chest x-ray, 1 view    Comparison: 5/20/16    Findings:  There is no consolidation, effusion, or pneumothorax.  Cardiomediastinal silhouette is unremarkable.  Note made of metallic anchor in the right humeral head.                            CT head 1/25/2018  Findings:    No hydrocephalus, midline shift, mass effect, or acute intracranial hemorrhage. Cortical sulcal pattern and gray-white matter differentiation is normal. All bilateral basal ganglia lacunar infarcts are noted. The visualized paranasal sinuses and mastoid air cells are clear. The skull is intact.      Pending Diagnostic Studies:     None          Medications:  Reconciled Home Medications:   Current Discharge Medication List      CONTINUE these medications which have NOT CHANGED    Details   alprazolam (XANAX) 2 MG Tab       gabapentin (NEURONTIN) 300 MG capsule Take 300 mg by mouth 2 (two) times daily. Unsure of dose      LINZESS 290 mcg Cap       methocarbamol (ROBAXIN) 500 MG Tab       venlafaxine (EFFEXOR-XR) 75 MG 24 hr capsule       zolpidem (AMBIEN) 10 mg Tab          STOP taking these medications       lisinopril-hydrochlorothiazide (PRINZIDE,ZESTORETIC) 10-12.5 mg per tablet Comments:   Reason for Stopping:               Indwelling Lines/Drains at time of discharge:   Lines/Drains/Airways          No matching active lines, drains, or airways          Time spent on the discharge of patient: 45 minutes  Patient was seen and examined on the date of discharge and determined to be suitable for discharge.         Lesly Collins PA-C  Department of Hospital Medicine  [facility]  Pager: [phone]

## 2018-01-29 NOTE — PLAN OF CARE
Problem: Patient Care Overview  Goal: Plan of Care Review  Outcome: Ongoing (interventions implemented as appropriate)  NSR. No true red alarms noted. AAO x 4. NAD. No c/o pain. Neuro checks every 4 hours. No acute changes. sodium bicarb infusing at 75 ml/h. Patient verbalized understanding the plan of care. Fall precautions maintained. Room near nursing station. Bed alarm set, bed in low and locked position, side rails up x 2, call light within reach. Continue to monitor

## 2018-01-29 NOTE — PROGRESS NOTES
Progress Note  [facility]    Admit Date: 1/26/2018   LOS: 3 days     SUBJECTIVE:     Follow-up For:  OLGA    Pleasant, no reported events overnight.  She is not having any chest pain, SOB, palp, HA, change in vision    OBJECTIVE:     Vital Signs (Most Recent)  Temp: 99 °F (37.2 °C) (01/29/18 0722)  Pulse: 82 (01/29/18 0722)  Resp: 18 (01/29/18 0722)  BP: 100/60 (01/29/18 0722)  SpO2: 98 % (01/29/18 0821)    Vital Signs Range (Last 24H):  Temp:  [98.4 °F (36.9 °C)-99 °F (37.2 °C)]   Pulse:  []   Resp:  [16-20]   BP: (100-121)/(57-76)   SpO2:  [98 %]     I & O (Last 24H):    Intake/Output Summary (Last 24 hours) at 01/29/18 1052  Last data filed at 01/29/18 0648   Gross per 24 hour   Intake          1148.75 ml   Output             1275 ml   Net          -126.25 ml     Physical Exam:  NAD  No JVD  No murmurs  Lungs are clear  Soft +BS  Ext: no edema    Laboratory:  CBC:  No results for input(s): WBC, RBC, HGB, HCT, PLT in the last 24 hours.  BMP:    Recent Labs  Lab 01/29/18  0457      K 3.6   CL 99   CO2 34*   BUN 22*   CREATININE 2.0*   CALCIUM 8.8      CPK 25 K    ASSESSMENT/PLAN:     A 48 y/o female presents with alter mental status/encephalopathy d/t opiates overdose found to have OLGA etiology is consistent with Rhabdomyolysis CPK > 32K. Her labs also revealed anion gap metabolic acidosis     Plan:     1- stage 3 OLGA according to AKIN criteria.  -- etiologies d/t Rhabdomyolysis (from a fall and muscle lysis from a prolong time down) + ischemic ATN (hyporenal perfusion/hypotensive)  -- volume status: euvolemia  -- electrolytes: no hyperkalemia & no hyperphosphatemia  -- anion gap metabolic acidosis 16 resolved     -- continue bicarb infusion at 75 ml/hr for one more day and then discontinue    -- follow up with dr. Cris Haynes MD  [facility] PGY-5

## 2018-01-29 NOTE — ASSESSMENT & PLAN NOTE
Elevated LFTs   CK 50904 >> 88900 >> 69505 >> 85751.  Pt had fall down 5 steps on 1/25/2018. Found on floor and minimally responsive on morning of 1/26/2018.  Drug screen positive for opioids and benzos which patient takes chronically.   confirms prescriptions.  Admits to taking a pain pill from her neighbor after her fall on 1/26/2018.  Is prescribed alprazolam 2 mg and tramadol 50 mg and zolpidem 10 mg PRN nightly. Received IVF with good improvement.  Elevated BUN, Cr, AST and ALT likely 2/2 rhabdo - all improving.  Encouraged good PO fluid intake. F/U PCP in 1-2 weeks for repeat labs. Pt is eating, drinking, urinating and ambulating well.

## 2018-01-29 NOTE — ASSESSMENT & PLAN NOTE
Improved to BUN 22, Cr 2.0.  Suspect 2/2 rhabdomyolysis.  CT abd/pelvis shows no renal abnormalities.  [facility] consulted and saw patient in ED. Repeat labs at PCP follow-up appt.

## 2018-01-29 NOTE — ASSESSMENT & PLAN NOTE
"Drug screen positive for opioids and benzos. Pt is prescribed both and states, "I'm not a drug addict."     "

## 2018-01-31 ENCOUNTER — PATIENT OUTREACH (OUTPATIENT)
Dept: ADMINISTRATIVE | Facility: CLINIC | Age: 48
End: 2018-01-31

## 2018-01-31 LAB
BACTERIA BLD CULT: NORMAL
BACTERIA BLD CULT: NORMAL

## 2018-01-31 NOTE — PATIENT INSTRUCTIONS
Rhabdomyolysis     Rhabdomyolysis is a condition that occurs when a large amount of muscle is damaged. When muscle fibers break down, they release substances into the bloodstream. One of these is a protein called myoglobin. Myoglobin can damage the kidneys. Other substances released by damaged muscles can cause chemical and fluid imbalance in the body. Because of the damage and imbalances, the kidneys stop working correctly. This can be dangerous, even fatal. Rhabdomyolysis is a medical emergency. Treatment is always done in the hospital.  What causes rhabdomyolysis?   Causes include:  · Trauma (such as a car accident), especially crush injuries  · Extended overexertion of the muscles (such as during marathon running)  · Blockage of an artery or vein that leads to muscle death (such as deep vein thrombosis)  · High-voltage electric shock (such as from lightning or power lines)  · Seizures  · Abuse of alcohol  · Use of certain illegal drugs  · Taking certain prescription drugs, such as statins  · Infection  · Heat stroke  · Metabolic imbalances  · Polymyositis, an inflammatory condition  · Severe burns  · Certain genetic disorders  Warning signs of rhabdomyolysis  Rhabdomyolysis is a medical emergency. If you have any of the following symptoms, go to the emergency room:  · Dark, brownish or pinkish-red urine  · Unusually stiff, achy, or tender muscles  · Unusual muscle weakness  Other symptoms include fever, malaise, nausea, vomiting, and bruising.  As the condition worsens, other symptoms may occur. If you have any of these symptoms, go to the emergency room:  · Swelling of the hands and feet  · Trouble breathing  · Abnormal heartbeat  · Unexplained bleeding   Rhabdomyolysis treatment  Treatment is always done in the hospital. An intravenous (IV) line is put into a vein in your arm or hand. IV fluids are then given to flush myoglobin and other harmful substances from the blood. Medicines may also be given to  protect the kidneys. Other medicines are given to treat fluid and chemical imbalances and to help prevent complications. You may also be given pain medicine to control discomfort.  The hospital stay for rhabdomyolysis is several days or longer. During this time, youre monitored to be sure no further problems develop. Your kidneys are checked for long-term damage. And the underlying cause of the condition is determined and treated if needed.  Follow-up care  With early treatment, the kidneys often recover without long-term damage. In some cases, though, permanent kidney damage may have been done. If this is the case, your doctor will talk to you about any further treatment that is needed. And you and your doctor can discuss treating any underlying medical cause of the condition.  Date Last Reviewed: 7/15/2015  © 2375-3214 [employer]. [address] [place] [address], [place] [address]. All rights reserved. This information is not intended as a substitute for professional medical care. Always follow your healthcare professional's instructions.

## 2018-02-03 ENCOUNTER — HOSPITAL ENCOUNTER (EMERGENCY)
Facility: HOSPITAL | Age: 48
Discharge: HOME | End: 2018-02-04
Attending: EMERGENCY MEDICINE
Payer: MEDICAID

## 2018-02-03 DIAGNOSIS — R10.13 EPIGASTRIC ABDOMINAL PAIN: Primary | ICD-10-CM

## 2018-02-03 DIAGNOSIS — K92.1 BLACK TARRY STOOLS: ICD-10-CM

## 2018-02-03 DIAGNOSIS — D50.0 ANEMIA, BLOOD LOSS: ICD-10-CM

## 2018-02-03 LAB
ALBUMIN SERPL BCP-MCNC: 3 G/DL
ALP SERPL-CCNC: 51 U/L
ALT SERPL W/O P-5'-P-CCNC: 79 U/L
ANION GAP SERPL CALC-SCNC: 12 MMOL/L
AST SERPL-CCNC: 33 U/L
BASOPHILS # BLD AUTO: 0.02 K/UL
BASOPHILS NFR BLD: 0.2 %
BILIRUB SERPL-MCNC: 0.3 MG/DL
BUN SERPL-MCNC: 9 MG/DL
CALCIUM SERPL-MCNC: 8.5 MG/DL
CHLORIDE SERPL-SCNC: 103 MMOL/L
CO2 SERPL-SCNC: 25 MMOL/L
CREAT SERPL-MCNC: 0.9 MG/DL
DIFFERENTIAL METHOD: ABNORMAL
EOSINOPHIL # BLD AUTO: 0.1 K/UL
EOSINOPHIL NFR BLD: 0.7 %
ERYTHROCYTE [DISTWIDTH] IN BLOOD BY AUTOMATED COUNT: 12.7 %
EST. GFR  (AFRICAN AMERICAN): >60 ML/MIN/1.73 M^2
EST. GFR  (NON AFRICAN AMERICAN): >60 ML/MIN/1.73 M^2
GLUCOSE SERPL-MCNC: 84 MG/DL
HCT VFR BLD AUTO: 28.1 %
HGB BLD-MCNC: 9.3 G/DL
LIPASE SERPL-CCNC: 20 U/L
LYMPHOCYTES # BLD AUTO: 2.2 K/UL
LYMPHOCYTES NFR BLD: 24.3 %
MCH RBC QN AUTO: 27.8 PG
MCHC RBC AUTO-ENTMCNC: 33.1 G/DL
MCV RBC AUTO: 84 FL
MONOCYTES # BLD AUTO: 0.8 K/UL
MONOCYTES NFR BLD: 8.5 %
NEUTROPHILS # BLD AUTO: 6 K/UL
NEUTROPHILS NFR BLD: 66.1 %
OB PNL STL: POSITIVE
PLATELET # BLD AUTO: 328 K/UL
PMV BLD AUTO: 10.7 FL
POTASSIUM SERPL-SCNC: 3 MMOL/L
PROT SERPL-MCNC: 6.2 G/DL
RBC # BLD AUTO: 3.35 M/UL
SODIUM SERPL-SCNC: 140 MMOL/L
WBC # BLD AUTO: 9.05 K/UL

## 2018-02-03 PROCEDURE — 83690 ASSAY OF LIPASE: CPT

## 2018-02-03 PROCEDURE — 85025 COMPLETE CBC W/AUTO DIFF WBC: CPT

## 2018-02-03 PROCEDURE — 99284 EMERGENCY DEPT VISIT MOD MDM: CPT

## 2018-02-03 PROCEDURE — 25000003 PHARM REV CODE 250: Performed by: EMERGENCY MEDICINE

## 2018-02-03 PROCEDURE — 82272 OCCULT BLD FECES 1-3 TESTS: CPT

## 2018-02-03 PROCEDURE — 80053 COMPREHEN METABOLIC PANEL: CPT

## 2018-02-03 RX ORDER — ESOMEPRAZOLE MAGNESIUM 40 MG/1
40 CAPSULE, DELAYED RELEASE ORAL DAILY
Qty: 30 CAPSULE | Refills: 0 | Status: SHIPPED | OUTPATIENT
Start: 2018-02-03 | End: 2018-08-13

## 2018-02-03 RX ORDER — FAMOTIDINE 20 MG/1
20 TABLET, FILM COATED ORAL
Status: COMPLETED | OUTPATIENT
Start: 2018-02-03 | End: 2018-02-03

## 2018-02-03 RX ORDER — DOCUSATE SODIUM 100 MG/1
100 CAPSULE, LIQUID FILLED ORAL 2 TIMES DAILY
Qty: 60 CAPSULE | Refills: 0 | COMMUNITY
Start: 2018-02-03 | End: 2018-08-13

## 2018-02-03 RX ORDER — ONDANSETRON 4 MG/1
4 TABLET, ORALLY DISINTEGRATING ORAL
Status: COMPLETED | OUTPATIENT
Start: 2018-02-03 | End: 2018-02-03

## 2018-02-03 RX ORDER — FERROUS SULFATE 325(65) MG
325 TABLET ORAL DAILY
Qty: 30 TABLET | Refills: 0 | COMMUNITY
Start: 2018-02-03 | End: 2018-08-13

## 2018-02-03 RX ADMIN — LIDOCAINE HYDROCHLORIDE: 20 SOLUTION ORAL; TOPICAL at 11:02

## 2018-02-03 RX ADMIN — FAMOTIDINE 20 MG: 20 TABLET ORAL at 11:02

## 2018-02-03 RX ADMIN — ONDANSETRON 4 MG: 4 TABLET, ORALLY DISINTEGRATING ORAL at 11:02

## 2018-02-04 VITALS
HEART RATE: 125 BPM | DIASTOLIC BLOOD PRESSURE: 71 MMHG | WEIGHT: 150.13 LBS | HEIGHT: 61 IN | SYSTOLIC BLOOD PRESSURE: 105 MMHG | BODY MASS INDEX: 28.35 KG/M2 | TEMPERATURE: 98 F | RESPIRATION RATE: 18 BRPM | OXYGEN SATURATION: 96 %

## 2018-02-04 NOTE — ED NOTES
Chart review shows that pt has been tachycardic almost every ED visit since 2014. Pt. Denies CP. Denies SOB. Denies dizziness or light headedness. Asymptomatic. NAD.  Pores aware and gives okay to continue through with discharge disposition.

## 2018-02-04 NOTE — ED NOTES
"Pt. reports epigastric abd pain that began last night. Reports "several" episodes of black tarry stools. Unable to give exact number of episodes with bloody stools. Intermittent nausea. Denies vomiting and diarrhea. Denies hx of GI rectal bleeds. Denies use of blood thinners. Denies fever, chills. Denies CP. Denies SOB. Denies urinary symptoms or vaginal bleeding. Denies dizziness or lightheadedness. Denies weakness. Ambulates with steady gait. Pt. AAOx4. NAD.   "
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
## 2019-01-09 ENCOUNTER — HOSPITAL ENCOUNTER (EMERGENCY)
Facility: HOSPITAL | Age: 49
Discharge: HOME | End: 2019-01-09
Attending: EMERGENCY MEDICINE
Payer: MEDICAID

## 2019-01-09 VITALS
TEMPERATURE: 98 F | HEIGHT: 61 IN | SYSTOLIC BLOOD PRESSURE: 115 MMHG | OXYGEN SATURATION: 100 % | WEIGHT: 144 LBS | BODY MASS INDEX: 27.19 KG/M2 | DIASTOLIC BLOOD PRESSURE: 88 MMHG | HEART RATE: 88 BPM | RESPIRATION RATE: 18 BRPM

## 2019-01-09 DIAGNOSIS — R53.1 WEAKNESS GENERALIZED: ICD-10-CM

## 2019-01-09 DIAGNOSIS — R53.83 FATIGUE, UNSPECIFIED TYPE: Primary | ICD-10-CM

## 2019-01-09 DIAGNOSIS — M79.18 MUSCLE ACHE OF EXTREMITY: ICD-10-CM

## 2019-01-09 LAB
ALBUMIN SERPL BCP-MCNC: 3.4 G/DL
ALP SERPL-CCNC: 49 U/L
ALT SERPL W/O P-5'-P-CCNC: 13 U/L
ANION GAP SERPL CALC-SCNC: 7 MMOL/L
AST SERPL-CCNC: 14 U/L
BASOPHILS # BLD AUTO: 0 K/UL
BASOPHILS NFR BLD: 0 %
BILIRUB SERPL-MCNC: 0.2 MG/DL
BUN SERPL-MCNC: 14 MG/DL
CALCIUM SERPL-MCNC: 9 MG/DL
CHLORIDE SERPL-SCNC: 105 MMOL/L
CO2 SERPL-SCNC: 24 MMOL/L
CREAT SERPL-MCNC: 0.9 MG/DL
DIFFERENTIAL METHOD: ABNORMAL
EOSINOPHIL # BLD AUTO: 0 K/UL
EOSINOPHIL NFR BLD: 0.5 %
ERYTHROCYTE [DISTWIDTH] IN BLOOD BY AUTOMATED COUNT: 15.2 %
EST. GFR  (AFRICAN AMERICAN): >60 ML/MIN/1.73 M^2
EST. GFR  (NON AFRICAN AMERICAN): >60 ML/MIN/1.73 M^2
GLUCOSE SERPL-MCNC: 77 MG/DL
HCT VFR BLD AUTO: 37.3 %
HGB BLD-MCNC: 11.6 G/DL
LIPASE SERPL-CCNC: 8 U/L
LYMPHOCYTES # BLD AUTO: 1.4 K/UL
LYMPHOCYTES NFR BLD: 33.1 %
MCH RBC QN AUTO: 27.9 PG
MCHC RBC AUTO-ENTMCNC: 31.1 G/DL
MCV RBC AUTO: 90 FL
MONOCYTES # BLD AUTO: 0.4 K/UL
MONOCYTES NFR BLD: 9.7 %
NEUTROPHILS # BLD AUTO: 2.4 K/UL
NEUTROPHILS NFR BLD: 56.7 %
PLATELET # BLD AUTO: 341 K/UL
PLATELET BLD QL SMEAR: ABNORMAL
PMV BLD AUTO: 10.4 FL
POTASSIUM SERPL-SCNC: 4.7 MMOL/L
PROT SERPL-MCNC: 6.6 G/DL
RBC # BLD AUTO: 4.16 M/UL
SODIUM SERPL-SCNC: 136 MMOL/L
TROPONIN I SERPL DL<=0.01 NG/ML-MCNC: <0.006 NG/ML
WBC # BLD AUTO: 4.23 K/UL

## 2019-01-09 PROCEDURE — 80053 COMPREHEN METABOLIC PANEL: CPT

## 2019-01-09 PROCEDURE — 93010 EKG 12-LEAD: ICD-10-PCS | Mod: ,,, | Performed by: INTERNAL MEDICINE

## 2019-01-09 PROCEDURE — 84484 ASSAY OF TROPONIN QUANT: CPT

## 2019-01-09 PROCEDURE — 93005 ELECTROCARDIOGRAM TRACING: CPT

## 2019-01-09 PROCEDURE — 25000003 PHARM REV CODE 250: Performed by: EMERGENCY MEDICINE

## 2019-01-09 PROCEDURE — 93010 ELECTROCARDIOGRAM REPORT: CPT | Mod: ,,, | Performed by: INTERNAL MEDICINE

## 2019-01-09 PROCEDURE — 96361 HYDRATE IV INFUSION ADD-ON: CPT

## 2019-01-09 PROCEDURE — 99284 EMERGENCY DEPT VISIT MOD MDM: CPT | Mod: 25

## 2019-01-09 PROCEDURE — 85025 COMPLETE CBC W/AUTO DIFF WBC: CPT

## 2019-01-09 PROCEDURE — 83690 ASSAY OF LIPASE: CPT

## 2019-01-09 PROCEDURE — 96360 HYDRATION IV INFUSION INIT: CPT

## 2019-01-09 RX ADMIN — SODIUM CHLORIDE 1000 ML: 0.9 INJECTION, SOLUTION INTRAVENOUS at 06:01
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
## 2019-01-09 NOTE — PROVIDER PROGRESS NOTES - EMERGENCY DEPT.
Encounter Date: 1/9/2019    ED Physician Progress Notes             This is an assumption of care note.     Upon shift change, the patient was transferred to me from Dr. Knight @ 6:30 AM in stable condition.     Patient presented to ED with multiple complaints, including fatigue, weakness, dizziness, headache, bilateral leg pain, SOB. Recent viral URI. History of anxiety, chronic pain, depression.     Exam reassuring, ears clear, OP clear, lungs clear, no leg swelling/edema, no bruising/rash.  Vitals unremarkable, labs pending.    Update:   No acute events during shift.    Labs unremarkable.  Vitals remained stable in ED.  Patient counseled on symptomatic and supportive care, and recommend f/u with PCP for further evaluation/management.       EKG:  NSR, rate 91, no ST changes, no ischemia, normal intervals.  Compared with prior EKG dated 01/2018, grossly stable without significant change.      IMAGING:  Imaging Results    None           LABS:  Labs Reviewed   CBC W/ AUTO DIFFERENTIAL   COMPREHENSIVE METABOLIC PANEL   TROPONIN I   LIPASE         MEDICATIONS:  Medications   sodium chloride 0.9% bolus 1,000 mL (1,000 mLs Intravenous New Bag 1/9/19 0615)         IMPRESSION:  1. Fatigue, unspecified type    2. Weakness generalized    3. Muscle ache of extremity           DISCHARGE MEDICATIONS:  Current Discharge Medication List            DISPOSITION:  D/C in stable condition.

## 2019-01-09 NOTE — ED NOTES
Report received from SAGRARIO Juan. Care assumed. Pt AAOx4. Respirations even and unlabored. Pt VSS. Will continue to monitor.

## 2019-01-15 ENCOUNTER — NURSE TRIAGE (OUTPATIENT)
Dept: ADMINISTRATIVE | Facility: CLINIC | Age: 49
End: 2019-01-15

## 2019-01-15 ENCOUNTER — HOSPITAL ENCOUNTER (EMERGENCY)
Facility: HOSPITAL | Age: 49
Discharge: HOME | End: 2019-01-15
Attending: EMERGENCY MEDICINE
Payer: MEDICAID

## 2019-01-15 VITALS
WEIGHT: 140 LBS | RESPIRATION RATE: 18 BRPM | HEART RATE: 95 BPM | BODY MASS INDEX: 26.43 KG/M2 | SYSTOLIC BLOOD PRESSURE: 128 MMHG | TEMPERATURE: 98 F | DIASTOLIC BLOOD PRESSURE: 86 MMHG | HEIGHT: 61 IN | OXYGEN SATURATION: 100 %

## 2019-01-15 DIAGNOSIS — M79.661 RIGHT CALF PAIN: Primary | ICD-10-CM

## 2019-01-15 LAB
ALBUMIN SERPL BCP-MCNC: 4.1 G/DL
ALP SERPL-CCNC: 64 U/L
ALT SERPL W/O P-5'-P-CCNC: 104 U/L
ANION GAP SERPL CALC-SCNC: 7 MMOL/L
AST SERPL-CCNC: 354 U/L
BASOPHILS # BLD AUTO: 0.01 K/UL
BASOPHILS NFR BLD: 0.1 %
BILIRUB SERPL-MCNC: 0.2 MG/DL
BUN SERPL-MCNC: 14 MG/DL
CALCIUM SERPL-MCNC: 9 MG/DL
CHLORIDE SERPL-SCNC: 99 MMOL/L
CO2 SERPL-SCNC: 28 MMOL/L
CREAT SERPL-MCNC: 0.98 MG/DL
D DIMER PPP FEU-MCNC: 437 NG/ML
DIFFERENTIAL METHOD: ABNORMAL
EOSINOPHIL # BLD AUTO: 0.1 K/UL
EOSINOPHIL NFR BLD: 1 %
ERYTHROCYTE [DISTWIDTH] IN BLOOD BY AUTOMATED COUNT: 14.6 %
EST. GFR  (AFRICAN AMERICAN): >60 ML/MIN/1.73 M^2
EST. GFR  (NON AFRICAN AMERICAN): >60 ML/MIN/1.73 M^2
GLUCOSE SERPL-MCNC: 94 MG/DL
HCT VFR BLD AUTO: 38.1 %
HGB BLD-MCNC: 12.4 G/DL
INR PPP: 1.1
LYMPHOCYTES # BLD AUTO: 2.4 K/UL
LYMPHOCYTES NFR BLD: 31.9 %
MCH RBC QN AUTO: 28.1 PG
MCHC RBC AUTO-ENTMCNC: 32.5 G/DL
MCV RBC AUTO: 86 FL
MONOCYTES # BLD AUTO: 0.5 K/UL
MONOCYTES NFR BLD: 7.1 %
NEUTROPHILS # BLD AUTO: 4.6 K/UL
NEUTROPHILS NFR BLD: 59.8 %
PLATELET # BLD AUTO: 260 K/UL
PMV BLD AUTO: 11.2 FL
POTASSIUM SERPL-SCNC: 3.8 MMOL/L
PROT SERPL-MCNC: 7.5 G/DL
PROTHROMBIN TIME: 12 SEC
RBC # BLD AUTO: 4.41 M/UL
SODIUM SERPL-SCNC: 134 MMOL/L
WBC # BLD AUTO: 7.65 K/UL

## 2019-01-15 PROCEDURE — 99284 EMERGENCY DEPT VISIT MOD MDM: CPT | Mod: 25,ER

## 2019-01-15 PROCEDURE — 85610 PROTHROMBIN TIME: CPT | Mod: ER

## 2019-01-15 PROCEDURE — 85379 FIBRIN DEGRADATION QUANT: CPT | Mod: ER

## 2019-01-15 PROCEDURE — 85025 COMPLETE CBC W/AUTO DIFF WBC: CPT | Mod: ER

## 2019-01-15 PROCEDURE — 80053 COMPREHEN METABOLIC PANEL: CPT | Mod: ER

## 2019-01-15 NOTE — TELEPHONE ENCOUNTER
Reason for Disposition   Thigh or calf pain in only one leg and present > 1 hour    Protocols used: ST LEG PAIN-A-OH    Pt c/o pain to right leg, below knee. Pain for few days, denies injury. ED advised.
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
## 2019-03-08 ENCOUNTER — HOSPITAL ENCOUNTER (EMERGENCY)
Facility: HOSPITAL | Age: 49
Discharge: HOME | End: 2019-03-08
Attending: EMERGENCY MEDICINE
Payer: MEDICAID

## 2019-03-08 VITALS
BODY MASS INDEX: 28.83 KG/M2 | WEIGHT: 143 LBS | HEART RATE: 99 BPM | TEMPERATURE: 98 F | RESPIRATION RATE: 20 BRPM | SYSTOLIC BLOOD PRESSURE: 91 MMHG | OXYGEN SATURATION: 99 % | DIASTOLIC BLOOD PRESSURE: 52 MMHG | HEIGHT: 59 IN

## 2019-03-08 DIAGNOSIS — Z63.4 BEREAVEMENT REACTION: ICD-10-CM

## 2019-03-08 DIAGNOSIS — G47.00 INSOMNIA, UNSPECIFIED TYPE: Primary | ICD-10-CM

## 2019-03-08 DIAGNOSIS — F43.20 BEREAVEMENT REACTION: ICD-10-CM

## 2019-03-08 PROCEDURE — 99283 EMERGENCY DEPT VISIT LOW MDM: CPT | Mod: ER

## 2019-03-08 PROCEDURE — 25000003 PHARM REV CODE 250: Mod: ER | Performed by: EMERGENCY MEDICINE

## 2019-03-08 RX ORDER — QUETIAPINE FUMARATE 400 MG/1
800 TABLET, FILM COATED ORAL NIGHTLY
COMMUNITY

## 2019-03-08 RX ORDER — TRAZODONE HYDROCHLORIDE 50 MG/1
50 TABLET ORAL NIGHTLY
Qty: 30 TABLET | Refills: 11 | Status: SHIPPED | OUTPATIENT
Start: 2019-03-08 | End: 2021-03-26 | Stop reason: CLARIF

## 2019-03-08 RX ORDER — TRAZODONE HYDROCHLORIDE 50 MG/1
50 TABLET ORAL
Status: COMPLETED | OUTPATIENT
Start: 2019-03-08 | End: 2019-03-08

## 2019-03-08 RX ADMIN — TRAZODONE HYDROCHLORIDE 50 MG: 50 TABLET ORAL at 11:03

## 2019-03-08 SDOH — SOCIAL DETERMINANTS OF HEALTH (SDOH): DISSAPEARANCE AND DEATH OF FAMILY MEMBER: Z63.4

## 2019-03-09 NOTE — ED NOTES
"Pt stated father passed away unexpected yesterday and she called her doctor to get some sleep aids to help her go to sleep. Last night she took 2 seroquels and an ambien and was not able to sleep. Tonight she took 2 seroquel pills and still has not been able to sleep. Pt stated " I would like some medication to make me go to sleep, Can I get a shot"?  "
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
## 2020-01-08 ENCOUNTER — TELEPHONE (OUTPATIENT)
Dept: OBSTETRICS AND GYNECOLOGY | Facility: CLINIC | Age: 50
End: 2020-01-08

## 2020-01-08 NOTE — TELEPHONE ENCOUNTER
Appt scheduled.    ----- Message from Bertha Calixto sent at 1/8/2020 11:14 AM CST -----  Contact: Self   Type:  Sooner Appointment Request    Patient is requesting a sooner appointment.  Patient declined first available appointment listed as well as another facility and provider. Patient will not accept being placed on the waitlist and is requesting a message be sent to doctor.    When is the first available appointment? 1/23    Would the patient rather a call back or a response via [facility]? Call Back    Best Call Back Number: [phone]

## 2020-01-16 ENCOUNTER — OFFICE VISIT (OUTPATIENT)
Dept: OBSTETRICS AND GYNECOLOGY | Facility: CLINIC | Age: 50
End: 2020-01-16
Payer: MEDICAID

## 2020-01-16 VITALS
BODY MASS INDEX: 29.23 KG/M2 | DIASTOLIC BLOOD PRESSURE: 78 MMHG | HEIGHT: 59 IN | SYSTOLIC BLOOD PRESSURE: 112 MMHG | WEIGHT: 145 LBS

## 2020-01-16 DIAGNOSIS — Z90.710 STATUS POST HYSTERECTOMY: ICD-10-CM

## 2020-01-16 DIAGNOSIS — N95.1 MENOPAUSAL SYNDROME: ICD-10-CM

## 2020-01-16 DIAGNOSIS — Z01.419 WELL WOMAN EXAM WITH ROUTINE GYNECOLOGICAL EXAM: Primary | ICD-10-CM

## 2020-01-16 DIAGNOSIS — Z12.31 SCREENING MAMMOGRAM, ENCOUNTER FOR: ICD-10-CM

## 2020-01-16 PROCEDURE — 99999 PR PBB SHADOW E&M-EST. PATIENT-LVL III: ICD-10-PCS | Mod: PBBFAC,,, | Performed by: OBSTETRICS & GYNECOLOGY

## 2020-01-16 PROCEDURE — 99999 PR PBB SHADOW E&M-EST. PATIENT-LVL III: CPT | Mod: PBBFAC,,, | Performed by: OBSTETRICS & GYNECOLOGY

## 2020-01-16 PROCEDURE — 99213 OFFICE O/P EST LOW 20 MIN: CPT | Mod: PBBFAC | Performed by: OBSTETRICS & GYNECOLOGY

## 2020-01-16 PROCEDURE — 99386 PR PREVENTIVE VISIT,NEW,40-64: ICD-10-PCS | Mod: S$PBB,,, | Performed by: OBSTETRICS & GYNECOLOGY

## 2020-01-16 PROCEDURE — 99386 PREV VISIT NEW AGE 40-64: CPT | Mod: S$PBB,,, | Performed by: OBSTETRICS & GYNECOLOGY

## 2020-01-16 RX ORDER — ESTRADIOL 1 MG/1
1 TABLET ORAL DAILY
Qty: 30 TABLET | Refills: 6 | Status: SHIPPED | OUTPATIENT
Start: 2020-01-16 | End: 2020-10-29
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
## 2020-02-07 ENCOUNTER — TELEPHONE (OUTPATIENT)
Dept: OBSTETRICS AND GYNECOLOGY | Facility: CLINIC | Age: 50
End: 2020-02-07

## 2020-02-07 DIAGNOSIS — B96.89 BACTERIAL VAGINOSIS: Primary | ICD-10-CM

## 2020-02-07 DIAGNOSIS — N76.0 BACTERIAL VAGINOSIS: Primary | ICD-10-CM

## 2020-02-07 RX ORDER — METRONIDAZOLE 500 MG/1
500 TABLET ORAL EVERY 12 HOURS
Qty: 14 TABLET | Refills: 0 | Status: SHIPPED | OUTPATIENT
Start: 2020-02-07 | End: 2020-10-29

## 2020-03-02 ENCOUNTER — HOSPITAL ENCOUNTER (EMERGENCY)
Facility: HOSPITAL | Age: 50
Discharge: HOME | End: 2020-03-02
Attending: EMERGENCY MEDICINE
Payer: MEDICAID

## 2020-03-02 VITALS
DIASTOLIC BLOOD PRESSURE: 87 MMHG | WEIGHT: 154 LBS | RESPIRATION RATE: 18 BRPM | OXYGEN SATURATION: 100 % | SYSTOLIC BLOOD PRESSURE: 125 MMHG | TEMPERATURE: 98 F | HEIGHT: 59 IN | HEART RATE: 87 BPM | BODY MASS INDEX: 31.04 KG/M2

## 2020-03-02 DIAGNOSIS — M79.662 PAIN OF LEFT LOWER LEG: ICD-10-CM

## 2020-03-02 DIAGNOSIS — M79.10 MYALGIA: Primary | ICD-10-CM

## 2020-03-02 DIAGNOSIS — R07.9 CHEST PAIN: ICD-10-CM

## 2020-03-02 DIAGNOSIS — R10.32 LEFT GROIN PAIN: ICD-10-CM

## 2020-03-02 DIAGNOSIS — M25.512 LEFT SHOULDER PAIN, UNSPECIFIED CHRONICITY: ICD-10-CM

## 2020-03-02 LAB
ALBUMIN SERPL BCP-MCNC: 4.5 G/DL (ref 3.5–5.2)
ALP SERPL-CCNC: 54 U/L (ref 38–126)
ALT SERPL W/O P-5'-P-CCNC: 12 U/L (ref 10–44)
ANION GAP SERPL CALC-SCNC: 10 MMOL/L (ref 8–16)
APTT BLDCRRT: 24.5 SEC (ref 21–32)
AST SERPL-CCNC: 23 U/L (ref 15–46)
BASOPHILS # BLD AUTO: 0.02 K/UL (ref 0–0.2)
BASOPHILS NFR BLD: 0.8 % (ref 0–1.9)
BILIRUB SERPL-MCNC: 0.3 MG/DL (ref 0.1–1)
BUN SERPL-MCNC: 13 MG/DL (ref 7–17)
CALCIUM SERPL-MCNC: 9.8 MG/DL (ref 8.7–10.5)
CHLORIDE SERPL-SCNC: 104 MMOL/L (ref 95–110)
CO2 SERPL-SCNC: 25 MMOL/L (ref 23–29)
CREAT SERPL-MCNC: 1.07 MG/DL (ref 0.5–1.4)
D DIMER PPP FEU-MCNC: 241 NG/ML
DIFFERENTIAL METHOD: ABNORMAL
EOSINOPHIL # BLD AUTO: 0.1 K/UL (ref 0–0.5)
EOSINOPHIL NFR BLD: 5.8 % (ref 0–8)
ERYTHROCYTE [DISTWIDTH] IN BLOOD BY AUTOMATED COUNT: 13.6 % (ref 11.5–14.5)
EST. GFR  (AFRICAN AMERICAN): >60 ML/MIN/1.73 M^2
EST. GFR  (NON AFRICAN AMERICAN): >60 ML/MIN/1.73 M^2
GLUCOSE SERPL-MCNC: 85 MG/DL (ref 70–110)
HCT VFR BLD AUTO: 40.4 % (ref 37–48.5)
HGB BLD-MCNC: 12.7 G/DL (ref 12–16)
IMM GRANULOCYTES # BLD AUTO: 0.01 K/UL (ref 0–0.04)
IMM GRANULOCYTES NFR BLD AUTO: 0.4 % (ref 0–0.5)
INR PPP: 1.1 (ref 0.8–1.2)
LYMPHOCYTES # BLD AUTO: 1.1 K/UL (ref 1–4.8)
LYMPHOCYTES NFR BLD: 44.2 % (ref 18–48)
MCH RBC QN AUTO: 27.6 PG (ref 27–31)
MCHC RBC AUTO-ENTMCNC: 31.4 G/DL (ref 32–36)
MCV RBC AUTO: 88 FL (ref 82–98)
MONOCYTES # BLD AUTO: 0.2 K/UL (ref 0.3–1)
MONOCYTES NFR BLD: 10 % (ref 4–15)
NEUTROPHILS # BLD AUTO: 0.9 K/UL (ref 1.8–7.7)
NEUTROPHILS NFR BLD: 38.8 % (ref 38–73)
NRBC BLD-RTO: 0 /100 WBC
PLATELET # BLD AUTO: 304 K/UL (ref 150–350)
PMV BLD AUTO: 11.1 FL (ref 9.2–12.9)
POTASSIUM SERPL-SCNC: 4.1 MMOL/L (ref 3.5–5.1)
PROT SERPL-MCNC: 7.9 G/DL (ref 6–8.4)
PROTHROMBIN TIME: 11.6 SEC (ref 9–12.5)
RBC # BLD AUTO: 4.6 M/UL (ref 4–5.4)
SODIUM SERPL-SCNC: 139 MMOL/L (ref 136–145)
TROPONIN I SERPL DL<=0.01 NG/ML-MCNC: <0.012 NG/ML (ref 0.01–0.03)
WBC # BLD AUTO: 2.4 K/UL (ref 3.9–12.7)

## 2020-03-02 PROCEDURE — 93005 ELECTROCARDIOGRAM TRACING: CPT | Mod: ER

## 2020-03-02 PROCEDURE — 85025 COMPLETE CBC W/AUTO DIFF WBC: CPT | Mod: ER

## 2020-03-02 PROCEDURE — 85610 PROTHROMBIN TIME: CPT | Mod: ER

## 2020-03-02 PROCEDURE — 93010 EKG 12-LEAD: ICD-10-PCS | Mod: ,,, | Performed by: INTERNAL MEDICINE

## 2020-03-02 PROCEDURE — 93010 ELECTROCARDIOGRAM REPORT: CPT | Mod: ,,, | Performed by: INTERNAL MEDICINE

## 2020-03-02 PROCEDURE — 80053 COMPREHEN METABOLIC PANEL: CPT | Mod: ER

## 2020-03-02 PROCEDURE — 84484 ASSAY OF TROPONIN QUANT: CPT | Mod: ER

## 2020-03-02 PROCEDURE — 99285 EMERGENCY DEPT VISIT HI MDM: CPT | Mod: 25,ER

## 2020-03-02 PROCEDURE — 25000003 PHARM REV CODE 250: Mod: ER | Performed by: PHYSICIAN ASSISTANT

## 2020-03-02 PROCEDURE — 85379 FIBRIN DEGRADATION QUANT: CPT | Mod: ER

## 2020-03-02 PROCEDURE — 85730 THROMBOPLASTIN TIME PARTIAL: CPT | Mod: ER

## 2020-03-02 RX ORDER — ASPIRIN 325 MG
325 TABLET, DELAYED RELEASE (ENTERIC COATED) ORAL
Status: COMPLETED | OUTPATIENT
Start: 2020-03-02 | End: 2020-03-02

## 2020-03-02 RX ADMIN — ASPIRIN 325 MG: 325 TABLET, COATED ORAL at 09:03

## 2020-03-02 NOTE — DISCHARGE INSTRUCTIONS
Follow up with PCP for continued care.  Return to ED with any worsening of symptoms or condition.  Ambulate and range as tolerated.
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
## 2020-04-06 ENCOUNTER — TELEPHONE (OUTPATIENT)
Dept: OBSTETRICS AND GYNECOLOGY | Facility: CLINIC | Age: 50
End: 2020-04-06

## 2020-04-06 DIAGNOSIS — R10.2 FEMALE PELVIC PAIN: Primary | ICD-10-CM

## 2020-04-06 RX ORDER — TRAMADOL HYDROCHLORIDE 50 MG/1
50 TABLET ORAL EVERY 6 HOURS PRN
Qty: 10 TABLET | Refills: 0 | Status: SHIPPED | OUTPATIENT
Start: 2020-04-06

## 2020-04-06 NOTE — TELEPHONE ENCOUNTER
----- Message from Cecy Aquino MA sent at 4/3/2020  3:29 PM CDT -----      ----- Message -----  From: Leona Kirit  Sent: 4/3/2020   2:22 PM CDT  To: [facility] BENITEZ Staff    Type: Patient Call Back    Who called: pt     What is the request in detail: pt states she has been having a lot of pain in her lower stomach and back and asking for Tramadol to be called in.     Can the clinic reply by [facility]? No     Would the patient rather a call back or a response via [facility]? Call back     Best call back number: [phone]    Additional Information: .. Pt states [id number] - [facility] [address] [facility] AT [facility] [address]  [address] [facility] [address]  Phone: [phone] Fax: [phone]

## 2020-04-06 NOTE — TELEPHONE ENCOUNTER
Tramadol per request #10  Not sure if this is the correct medication for her pain.  48 yo woman, status post hysterectomy

## 2020-06-26 ENCOUNTER — HOSPITAL ENCOUNTER (OUTPATIENT)
Dept: RADIOLOGY | Facility: HOSPITAL | Age: 50
Discharge: HOME | End: 2020-06-26
Attending: INTERNAL MEDICINE
Payer: MEDICAID

## 2020-06-26 DIAGNOSIS — N18.2 CHRONIC KIDNEY DISEASE, STAGE 2 (MILD): ICD-10-CM

## 2020-06-26 DIAGNOSIS — E21.1 SECONDARY HYPERPARATHYROIDISM, NOT ELSEWHERE CLASSIFIED: ICD-10-CM

## 2020-06-26 DIAGNOSIS — R80.9 PROTEINURIA, UNSPECIFIED: ICD-10-CM

## 2020-06-26 DIAGNOSIS — I10 ESSENTIAL (PRIMARY) HYPERTENSION: ICD-10-CM

## 2020-06-26 DIAGNOSIS — E55.9 VITAMIN D DEFICIENCY, UNSPECIFIED: ICD-10-CM

## 2020-06-26 PROCEDURE — 93975 VASCULAR STUDY: CPT | Mod: TC,PO

## 2020-09-11 ENCOUNTER — NURSE TRIAGE (OUTPATIENT)
Dept: ADMINISTRATIVE | Facility: CLINIC | Age: 50
End: 2020-09-11

## 2020-09-12 NOTE — TELEPHONE ENCOUNTER
Pt calling to discuss lab results in ref to blood work.  Pt states she reviewed lab and it stated it was a critical result.  States she called the MD office and was told by a nurse the MD would reach out to her if needed.  Informed pt that lab results will need to to discussed with her with the ordering provider.

## 2020-10-29 ENCOUNTER — HOSPITAL ENCOUNTER (EMERGENCY)
Facility: HOSPITAL | Age: 50
Discharge: HOME | End: 2020-10-29
Attending: EMERGENCY MEDICINE
Payer: MEDICAID

## 2020-10-29 VITALS
WEIGHT: 154 LBS | DIASTOLIC BLOOD PRESSURE: 67 MMHG | SYSTOLIC BLOOD PRESSURE: 115 MMHG | BODY MASS INDEX: 31.04 KG/M2 | OXYGEN SATURATION: 100 % | HEIGHT: 59 IN | RESPIRATION RATE: 19 BRPM | HEART RATE: 91 BPM | TEMPERATURE: 99 F

## 2020-10-29 DIAGNOSIS — R53.1 GENERALIZED WEAKNESS: ICD-10-CM

## 2020-10-29 DIAGNOSIS — F41.1 ANXIETY REACTION: Primary | ICD-10-CM

## 2020-10-29 LAB
ALBUMIN SERPL BCP-MCNC: 4.5 G/DL (ref 3.5–5.2)
ALP SERPL-CCNC: 61 U/L (ref 38–126)
ALT SERPL W/O P-5'-P-CCNC: 18 U/L (ref 10–44)
AMORPH CRY UR QL COMP ASSIST: ABNORMAL
ANION GAP SERPL CALC-SCNC: 10 MMOL/L (ref 8–16)
AST SERPL-CCNC: 33 U/L (ref 15–46)
BACTERIA #/AREA URNS AUTO: ABNORMAL /HPF
BASOPHILS # BLD AUTO: 0.01 K/UL (ref 0–0.2)
BASOPHILS NFR BLD: 0.2 % (ref 0–1.9)
BILIRUB SERPL-MCNC: 0.4 MG/DL (ref 0.1–1)
BILIRUB UR QL STRIP: NEGATIVE
BUN SERPL-MCNC: 14 MG/DL (ref 7–17)
CALCIUM SERPL-MCNC: 9.3 MG/DL (ref 8.7–10.5)
CHLORIDE SERPL-SCNC: 104 MMOL/L (ref 95–110)
CLARITY UR REFRACT.AUTO: CLEAR
CO2 SERPL-SCNC: 27 MMOL/L (ref 23–29)
COLOR UR AUTO: ABNORMAL
CREAT SERPL-MCNC: 0.99 MG/DL (ref 0.5–1.4)
DIFFERENTIAL METHOD: ABNORMAL
EOSINOPHIL # BLD AUTO: 0 K/UL (ref 0–0.5)
EOSINOPHIL NFR BLD: 0.2 % (ref 0–8)
ERYTHROCYTE [DISTWIDTH] IN BLOOD BY AUTOMATED COUNT: 14.4 % (ref 11.5–14.5)
EST. GFR  (AFRICAN AMERICAN): >60 ML/MIN/1.73 M^2
EST. GFR  (NON AFRICAN AMERICAN): >60 ML/MIN/1.73 M^2
GLUCOSE SERPL-MCNC: 87 MG/DL (ref 70–110)
GLUCOSE UR QL STRIP: NEGATIVE
HCT VFR BLD AUTO: 39.1 % (ref 37–48.5)
HGB BLD-MCNC: 12.4 G/DL (ref 12–16)
HGB UR QL STRIP: ABNORMAL
HYALINE CASTS UR QL AUTO: 0 /LPF
IMM GRANULOCYTES # BLD AUTO: 0.01 K/UL (ref 0–0.04)
IMM GRANULOCYTES NFR BLD AUTO: 0.2 % (ref 0–0.5)
INFLUENZA A, MOLECULAR: NEGATIVE
INFLUENZA B, MOLECULAR: NEGATIVE
KETONES UR QL STRIP: ABNORMAL
LEUKOCYTE ESTERASE UR QL STRIP: ABNORMAL
LYMPHOCYTES # BLD AUTO: 1.5 K/UL (ref 1–4.8)
LYMPHOCYTES NFR BLD: 25.5 % (ref 18–48)
MCH RBC QN AUTO: 26.7 PG (ref 27–31)
MCHC RBC AUTO-ENTMCNC: 31.7 G/DL (ref 32–36)
MCV RBC AUTO: 84 FL (ref 82–98)
MICROSCOPIC COMMENT: ABNORMAL
MONOCYTES # BLD AUTO: 0.4 K/UL (ref 0.3–1)
MONOCYTES NFR BLD: 6 % (ref 4–15)
NEUTROPHILS # BLD AUTO: 4.1 K/UL (ref 1.8–7.7)
NEUTROPHILS NFR BLD: 67.9 % (ref 38–73)
NITRITE UR QL STRIP: NEGATIVE
NRBC BLD-RTO: 0 /100 WBC
PH UR STRIP: 5 [PH] (ref 5–8)
PLATELET # BLD AUTO: 278 K/UL (ref 150–350)
PMV BLD AUTO: 10.8 FL (ref 9.2–12.9)
POTASSIUM SERPL-SCNC: 3.8 MMOL/L (ref 3.5–5.1)
PROT SERPL-MCNC: 8.2 G/DL (ref 6–8.4)
PROT UR QL STRIP: ABNORMAL
RBC # BLD AUTO: 4.64 M/UL (ref 4–5.4)
RBC #/AREA URNS AUTO: 0 /HPF (ref 0–4)
SARS-COV-2 RDRP RESP QL NAA+PROBE: NEGATIVE
SODIUM SERPL-SCNC: 141 MMOL/L (ref 136–145)
SP GR UR STRIP: 1.03 (ref 1–1.03)
SPECIMEN SOURCE: NORMAL
TROPONIN I SERPL DL<=0.01 NG/ML-MCNC: <0.012 NG/ML (ref 0.01–0.03)
URN SPEC COLLECT METH UR: ABNORMAL
UROBILINOGEN UR STRIP-ACNC: NEGATIVE EU/DL
WBC # BLD AUTO: 6.04 K/UL (ref 3.9–12.7)
WBC #/AREA URNS AUTO: 2 /HPF (ref 0–5)

## 2020-10-29 PROCEDURE — 93010 EKG 12-LEAD: ICD-10-PCS | Mod: ,,, | Performed by: INTERNAL MEDICINE

## 2020-10-29 PROCEDURE — 25500020 PHARM REV CODE 255: Mod: ER | Performed by: EMERGENCY MEDICINE

## 2020-10-29 PROCEDURE — 81000 URINALYSIS NONAUTO W/SCOPE: CPT | Mod: ER

## 2020-10-29 PROCEDURE — 93005 ELECTROCARDIOGRAM TRACING: CPT | Mod: ER

## 2020-10-29 PROCEDURE — 93010 ELECTROCARDIOGRAM REPORT: CPT | Mod: ,,, | Performed by: INTERNAL MEDICINE

## 2020-10-29 PROCEDURE — 80053 COMPREHEN METABOLIC PANEL: CPT | Mod: ER

## 2020-10-29 PROCEDURE — 99285 EMERGENCY DEPT VISIT HI MDM: CPT | Mod: 25,ER

## 2020-10-29 PROCEDURE — 84484 ASSAY OF TROPONIN QUANT: CPT | Mod: ER

## 2020-10-29 PROCEDURE — 87502 INFLUENZA DNA AMP PROBE: CPT | Mod: ER

## 2020-10-29 PROCEDURE — 85025 COMPLETE CBC W/AUTO DIFF WBC: CPT | Mod: ER

## 2020-10-29 PROCEDURE — U0002 COVID-19 LAB TEST NON-CDC: HCPCS | Mod: ER

## 2020-10-29 RX ADMIN — IOHEXOL 100 ML: 350 INJECTION, SOLUTION INTRAVENOUS at 09:10
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
## 2021-02-08 ENCOUNTER — TELEPHONE (OUTPATIENT)
Dept: OBSTETRICS AND GYNECOLOGY | Facility: CLINIC | Age: 51
End: 2021-02-08

## 2021-02-08 DIAGNOSIS — N76.0 BACTERIAL VAGINOSIS: Primary | ICD-10-CM

## 2021-02-08 DIAGNOSIS — B96.89 BACTERIAL VAGINOSIS: Primary | ICD-10-CM

## 2021-02-08 DIAGNOSIS — B37.31 CANDIDA VAGINITIS: ICD-10-CM

## 2021-02-08 RX ORDER — FLUCONAZOLE 150 MG/1
150 TABLET ORAL DAILY
Qty: 1 TABLET | Refills: 0 | Status: SHIPPED | OUTPATIENT
Start: 2021-02-08 | End: 2021-02-09

## 2021-02-08 RX ORDER — METRONIDAZOLE 500 MG/1
500 TABLET ORAL EVERY 12 HOURS
Qty: 14 TABLET | Refills: 0 | Status: SHIPPED | OUTPATIENT
Start: 2021-02-08 | End: 2021-02-09 | Stop reason: SDUPTHER

## 2021-02-09 DIAGNOSIS — N76.0 BACTERIAL VAGINOSIS: ICD-10-CM

## 2021-02-09 DIAGNOSIS — B96.89 BACTERIAL VAGINOSIS: ICD-10-CM

## 2021-02-09 RX ORDER — METRONIDAZOLE 500 MG/1
500 TABLET ORAL EVERY 12 HOURS
Qty: 14 TABLET | Refills: 0 | Status: SHIPPED | OUTPATIENT
Start: 2021-02-09 | End: 2021-03-24

## 2021-03-22 ENCOUNTER — TELEPHONE (OUTPATIENT)
Dept: OBSTETRICS AND GYNECOLOGY | Facility: CLINIC | Age: 51
End: 2021-03-22

## 2021-03-22 DIAGNOSIS — B37.31 CANDIDA VAGINITIS: Primary | ICD-10-CM

## 2021-03-22 RX ORDER — FLUCONAZOLE 150 MG/1
150 TABLET ORAL DAILY
Qty: 1 TABLET | Refills: 0 | Status: SHIPPED | OUTPATIENT
Start: 2021-03-22 | End: 2021-03-23

## 2021-03-23 ENCOUNTER — TELEPHONE (OUTPATIENT)
Dept: OBSTETRICS AND GYNECOLOGY | Facility: CLINIC | Age: 51
End: 2021-03-23

## 2021-03-24 ENCOUNTER — TELEPHONE (OUTPATIENT)
Dept: OBSTETRICS AND GYNECOLOGY | Facility: CLINIC | Age: 51
End: 2021-03-24

## 2021-03-24 DIAGNOSIS — N89.8 VAGINAL DISCHARGE: Primary | ICD-10-CM

## 2021-03-24 RX ORDER — METRONIDAZOLE 500 MG/1
500 TABLET ORAL EVERY 12 HOURS
Qty: 14 TABLET | Refills: 0 | Status: SHIPPED | OUTPATIENT
Start: 2021-03-24 | End: 2023-01-19

## 2021-03-25 PROBLEM — K80.10 CALCULUS OF GALLBLADDER WITH CHRONIC CHOLECYSTITIS: Status: ACTIVE | Noted: 2021-03-25

## 2021-03-26 ENCOUNTER — HOSPITAL ENCOUNTER (OUTPATIENT)
Dept: PREADMISSION TESTING | Facility: HOSPITAL | Age: 51
Discharge: HOME | End: 2021-03-26
Attending: SURGERY
Payer: MEDICAID

## 2021-03-26 ENCOUNTER — ANESTHESIA EVENT (OUTPATIENT)
Dept: SURGERY | Facility: HOSPITAL | Age: 51
End: 2021-03-26
Payer: MEDICAID

## 2021-03-26 VITALS
BODY MASS INDEX: 31.65 KG/M2 | DIASTOLIC BLOOD PRESSURE: 81 MMHG | HEIGHT: 59 IN | SYSTOLIC BLOOD PRESSURE: 109 MMHG | OXYGEN SATURATION: 100 % | WEIGHT: 157 LBS | RESPIRATION RATE: 18 BRPM | HEART RATE: 80 BPM

## 2021-03-26 LAB — SARS-COV-2 RDRP RESP QL NAA+PROBE: NEGATIVE

## 2021-03-26 PROCEDURE — U0002 COVID-19 LAB TEST NON-CDC: HCPCS | Performed by: NURSE PRACTITIONER

## 2021-03-26 RX ORDER — LIDOCAINE HYDROCHLORIDE 10 MG/ML
1 INJECTION, SOLUTION EPIDURAL; INFILTRATION; INTRACAUDAL; PERINEURAL ONCE
Status: CANCELLED | OUTPATIENT
Start: 2021-03-26 | End: 2021-03-26

## 2021-03-26 RX ORDER — SODIUM CHLORIDE, SODIUM LACTATE, POTASSIUM CHLORIDE, CALCIUM CHLORIDE 600; 310; 30; 20 MG/100ML; MG/100ML; MG/100ML; MG/100ML
INJECTION, SOLUTION INTRAVENOUS CONTINUOUS
Status: CANCELLED | OUTPATIENT
Start: 2021-03-26

## 2021-03-27 ENCOUNTER — IMMUNIZATION (OUTPATIENT)
Dept: PRIMARY CARE CLINIC | Facility: CLINIC | Age: 51
End: 2021-03-27
Payer: MEDICAID

## 2021-03-27 DIAGNOSIS — Z23 NEED FOR VACCINATION: Primary | ICD-10-CM

## 2021-03-27 PROCEDURE — 91300 PR SARS-COV- 2 COVID-19 VACCINE, NO PRSV, 30MCG/0.3ML, IM: ICD-10-PCS | Mod: S$GLB,,, | Performed by: INTERNAL MEDICINE

## 2021-03-27 PROCEDURE — 0001A PR IMMUNIZ ADMIN, SARS-COV-2 COVID-19 VACC, 30MCG/0.3ML, 1ST DOSE: CPT | Mod: CV19,S$GLB,, | Performed by: INTERNAL MEDICINE

## 2021-03-27 PROCEDURE — 0001A PR IMMUNIZ ADMIN, SARS-COV-2 COVID-19 VACC, 30MCG/0.3ML, 1ST DOSE: ICD-10-PCS | Mod: CV19,S$GLB,, | Performed by: INTERNAL MEDICINE

## 2021-03-27 PROCEDURE — 91300 PR SARS-COV- 2 COVID-19 VACCINE, NO PRSV, 30MCG/0.3ML, IM: CPT | Mod: S$GLB,,, | Performed by: INTERNAL MEDICINE

## 2021-03-27 RX ADMIN — Medication 0.3 ML: at 05:03

## 2021-03-29 ENCOUNTER — ANESTHESIA (OUTPATIENT)
Dept: SURGERY | Facility: HOSPITAL | Age: 51
End: 2021-03-29
Payer: MEDICAID

## 2021-03-29 ENCOUNTER — HOSPITAL ENCOUNTER (OUTPATIENT)
Facility: HOSPITAL | Age: 51
Discharge: HOME | End: 2021-03-29
Attending: SURGERY | Admitting: SURGERY
Payer: MEDICAID

## 2021-03-29 VITALS
SYSTOLIC BLOOD PRESSURE: 103 MMHG | RESPIRATION RATE: 16 BRPM | DIASTOLIC BLOOD PRESSURE: 55 MMHG | BODY MASS INDEX: 31.85 KG/M2 | TEMPERATURE: 98 F | OXYGEN SATURATION: 98 % | HEIGHT: 59 IN | WEIGHT: 158 LBS | HEART RATE: 72 BPM

## 2021-03-29 DIAGNOSIS — K80.10 CALCULUS OF GALLBLADDER WITH CHRONIC CHOLECYSTITIS WITHOUT OBSTRUCTION: ICD-10-CM

## 2021-03-29 DIAGNOSIS — I10 ESSENTIAL HYPERTENSION: Chronic | ICD-10-CM

## 2021-03-29 DIAGNOSIS — D64.9 NORMOCYTIC ANEMIA: Chronic | ICD-10-CM

## 2021-03-29 PROBLEM — K80.20 CHOLECYSTOLITHIASIS: Status: ACTIVE | Noted: 2021-03-29

## 2021-03-29 PROCEDURE — 88304 PR  SURG PATH,LEVEL III: ICD-10-PCS | Mod: 26,,, | Performed by: PATHOLOGY

## 2021-03-29 PROCEDURE — 36000708 HC OR TIME LEV III 1ST 15 MIN: Performed by: SURGERY

## 2021-03-29 PROCEDURE — 71000016 HC POSTOP RECOV ADDL HR: Performed by: SURGERY

## 2021-03-29 PROCEDURE — 25000003 PHARM REV CODE 250: Performed by: NURSE ANESTHETIST, CERTIFIED REGISTERED

## 2021-03-29 PROCEDURE — 25000003 PHARM REV CODE 250: Performed by: SURGERY

## 2021-03-29 PROCEDURE — 88304 TISSUE EXAM BY PATHOLOGIST: CPT | Performed by: PATHOLOGY

## 2021-03-29 PROCEDURE — 63600175 PHARM REV CODE 636 W HCPCS: Performed by: STUDENT IN AN ORGANIZED HEALTH CARE EDUCATION/TRAINING PROGRAM

## 2021-03-29 PROCEDURE — 25000003 PHARM REV CODE 250: Performed by: STUDENT IN AN ORGANIZED HEALTH CARE EDUCATION/TRAINING PROGRAM

## 2021-03-29 PROCEDURE — 00790 ANES IPER UPR ABD NOS: CPT | Performed by: SURGERY

## 2021-03-29 PROCEDURE — 71000033 HC RECOVERY, INTIAL HOUR: Performed by: SURGERY

## 2021-03-29 PROCEDURE — 36000709 HC OR TIME LEV III EA ADD 15 MIN: Performed by: SURGERY

## 2021-03-29 PROCEDURE — 63600175 PHARM REV CODE 636 W HCPCS: Performed by: NURSE PRACTITIONER

## 2021-03-29 PROCEDURE — 37000009 HC ANESTHESIA EA ADD 15 MINS: Performed by: SURGERY

## 2021-03-29 PROCEDURE — 63600175 PHARM REV CODE 636 W HCPCS: Performed by: NURSE ANESTHETIST, CERTIFIED REGISTERED

## 2021-03-29 PROCEDURE — 37000008 HC ANESTHESIA 1ST 15 MINUTES: Performed by: SURGERY

## 2021-03-29 PROCEDURE — 71000015 HC POSTOP RECOV 1ST HR: Performed by: SURGERY

## 2021-03-29 PROCEDURE — 88304 TISSUE EXAM BY PATHOLOGIST: CPT | Mod: 26,,, | Performed by: PATHOLOGY

## 2021-03-29 PROCEDURE — 27201423 OPTIME MED/SURG SUP & DEVICES STERILE SUPPLY: Performed by: SURGERY

## 2021-03-29 RX ORDER — HYDROMORPHONE HYDROCHLORIDE 2 MG/ML
0.5 INJECTION, SOLUTION INTRAMUSCULAR; INTRAVENOUS; SUBCUTANEOUS EVERY 5 MIN PRN
Status: DISCONTINUED | OUTPATIENT
Start: 2021-03-29 | End: 2021-03-29 | Stop reason: HOSPADM

## 2021-03-29 RX ORDER — HYDROCODONE BITARTRATE AND ACETAMINOPHEN 5; 325 MG/1; MG/1
1 TABLET ORAL EVERY 4 HOURS PRN
Status: DISCONTINUED | OUTPATIENT
Start: 2021-03-29 | End: 2021-03-29 | Stop reason: HOSPADM

## 2021-03-29 RX ORDER — DEXAMETHASONE SODIUM PHOSPHATE 4 MG/ML
INJECTION, SOLUTION INTRA-ARTICULAR; INTRALESIONAL; INTRAMUSCULAR; INTRAVENOUS; SOFT TISSUE
Status: DISCONTINUED | OUTPATIENT
Start: 2021-03-29 | End: 2021-03-29

## 2021-03-29 RX ORDER — MUPIROCIN 20 MG/G
OINTMENT TOPICAL
Status: DISCONTINUED | OUTPATIENT
Start: 2021-03-29 | End: 2021-03-29

## 2021-03-29 RX ORDER — ACETAMINOPHEN 10 MG/ML
INJECTION, SOLUTION INTRAVENOUS
Status: DISCONTINUED | OUTPATIENT
Start: 2021-03-29 | End: 2021-03-29

## 2021-03-29 RX ORDER — ONDANSETRON HYDROCHLORIDE 2 MG/ML
INJECTION, SOLUTION INTRAMUSCULAR; INTRAVENOUS
Status: DISCONTINUED | OUTPATIENT
Start: 2021-03-29 | End: 2021-03-29

## 2021-03-29 RX ORDER — LIDOCAINE HYDROCHLORIDE 10 MG/ML
1 INJECTION, SOLUTION EPIDURAL; INFILTRATION; INTRACAUDAL; PERINEURAL ONCE
Status: DISCONTINUED | OUTPATIENT
Start: 2021-03-29 | End: 2021-03-29 | Stop reason: HOSPADM

## 2021-03-29 RX ORDER — BUPIVACAINE HYDROCHLORIDE 2.5 MG/ML
INJECTION, SOLUTION INFILTRATION; PERINEURAL
Status: DISCONTINUED | OUTPATIENT
Start: 2021-03-29 | End: 2021-03-29 | Stop reason: HOSPADM

## 2021-03-29 RX ORDER — SCOLOPAMINE TRANSDERMAL SYSTEM 1 MG/1
1 PATCH, EXTENDED RELEASE TRANSDERMAL
Status: DISCONTINUED | OUTPATIENT
Start: 2021-03-29 | End: 2021-03-29 | Stop reason: HOSPADM

## 2021-03-29 RX ORDER — NEOSTIGMINE METHYLSULFATE 1 MG/ML
INJECTION, SOLUTION INTRAVENOUS
Status: DISCONTINUED | OUTPATIENT
Start: 2021-03-29 | End: 2021-03-29

## 2021-03-29 RX ORDER — LIDOCAINE HCL/PF 100 MG/5ML
SYRINGE (ML) INTRAVENOUS
Status: DISCONTINUED | OUTPATIENT
Start: 2021-03-29 | End: 2021-03-29

## 2021-03-29 RX ORDER — HYDROCODONE BITARTRATE AND ACETAMINOPHEN 10; 325 MG/1; MG/1
1 TABLET ORAL EVERY 4 HOURS PRN
Status: DISCONTINUED | OUTPATIENT
Start: 2021-03-29 | End: 2021-03-29 | Stop reason: HOSPADM

## 2021-03-29 RX ORDER — SODIUM CHLORIDE 0.9 % (FLUSH) 0.9 %
10 SYRINGE (ML) INJECTION
Status: DISCONTINUED | OUTPATIENT
Start: 2021-03-29 | End: 2021-03-29 | Stop reason: HOSPADM

## 2021-03-29 RX ORDER — MIDAZOLAM HYDROCHLORIDE 1 MG/ML
INJECTION INTRAMUSCULAR; INTRAVENOUS
Status: DISCONTINUED | OUTPATIENT
Start: 2021-03-29 | End: 2021-03-29

## 2021-03-29 RX ORDER — PROPOFOL 10 MG/ML
VIAL (ML) INTRAVENOUS
Status: DISCONTINUED | OUTPATIENT
Start: 2021-03-29 | End: 2021-03-29

## 2021-03-29 RX ORDER — HYDROCODONE BITARTRATE AND ACETAMINOPHEN 5; 325 MG/1; MG/1
1 TABLET ORAL EVERY 6 HOURS PRN
Qty: 24 TABLET | Refills: 0 | Status: SHIPPED | OUTPATIENT
Start: 2021-03-29 | End: 2021-11-05

## 2021-03-29 RX ORDER — HYDROMORPHONE HYDROCHLORIDE 2 MG/ML
0.2 INJECTION, SOLUTION INTRAMUSCULAR; INTRAVENOUS; SUBCUTANEOUS EVERY 5 MIN PRN
Status: DISCONTINUED | OUTPATIENT
Start: 2021-03-29 | End: 2021-03-29 | Stop reason: HOSPADM

## 2021-03-29 RX ORDER — ONDANSETRON 2 MG/ML
4 INJECTION INTRAMUSCULAR; INTRAVENOUS DAILY PRN
Status: DISCONTINUED | OUTPATIENT
Start: 2021-03-29 | End: 2021-03-29 | Stop reason: HOSPADM

## 2021-03-29 RX ORDER — ACETAMINOPHEN 325 MG/1
650 TABLET ORAL EVERY 4 HOURS PRN
Status: DISCONTINUED | OUTPATIENT
Start: 2021-03-29 | End: 2021-03-29 | Stop reason: HOSPADM

## 2021-03-29 RX ORDER — ROCURONIUM BROMIDE 10 MG/ML
INJECTION, SOLUTION INTRAVENOUS
Status: DISCONTINUED | OUTPATIENT
Start: 2021-03-29 | End: 2021-03-29

## 2021-03-29 RX ORDER — FENTANYL CITRATE 50 UG/ML
INJECTION, SOLUTION INTRAMUSCULAR; INTRAVENOUS
Status: DISCONTINUED | OUTPATIENT
Start: 2021-03-29 | End: 2021-03-29

## 2021-03-29 RX ORDER — SODIUM CHLORIDE 9 MG/ML
INJECTION, SOLUTION INTRAVENOUS CONTINUOUS
Status: DISCONTINUED | OUTPATIENT
Start: 2021-03-29 | End: 2021-03-29 | Stop reason: HOSPADM

## 2021-03-29 RX ORDER — SODIUM CHLORIDE, SODIUM LACTATE, POTASSIUM CHLORIDE, CALCIUM CHLORIDE 600; 310; 30; 20 MG/100ML; MG/100ML; MG/100ML; MG/100ML
INJECTION, SOLUTION INTRAVENOUS CONTINUOUS
Status: DISCONTINUED | OUTPATIENT
Start: 2021-03-29 | End: 2021-03-29 | Stop reason: HOSPADM

## 2021-03-29 RX ORDER — KETOROLAC TROMETHAMINE 30 MG/ML
INJECTION, SOLUTION INTRAMUSCULAR; INTRAVENOUS
Status: DISCONTINUED | OUTPATIENT
Start: 2021-03-29 | End: 2021-03-29

## 2021-03-29 RX ADMIN — SODIUM CHLORIDE, SODIUM LACTATE, POTASSIUM CHLORIDE, AND CALCIUM CHLORIDE: .6; .31; .03; .02 INJECTION, SOLUTION INTRAVENOUS at 10:03

## 2021-03-29 RX ADMIN — ACETAMINOPHEN 1000 MG: 10 INJECTION, SOLUTION INTRAVENOUS at 12:03

## 2021-03-29 RX ADMIN — HYDROMORPHONE HYDROCHLORIDE 0.5 MG: 2 INJECTION, SOLUTION INTRAMUSCULAR; INTRAVENOUS; SUBCUTANEOUS at 01:03

## 2021-03-29 RX ADMIN — LIDOCAINE HYDROCHLORIDE 60 MG: 20 INJECTION, SOLUTION INTRAVENOUS at 12:03

## 2021-03-29 RX ADMIN — GLYCOPYRROLATE 0.6 MG: 0.2 INJECTION, SOLUTION INTRAMUSCULAR; INTRAVITREAL at 01:03

## 2021-03-29 RX ADMIN — MIDAZOLAM HYDROCHLORIDE 2 MG: 1 INJECTION, SOLUTION INTRAMUSCULAR; INTRAVENOUS at 12:03

## 2021-03-29 RX ADMIN — ONDANSETRON 4 MG: 2 INJECTION, SOLUTION INTRAMUSCULAR; INTRAVENOUS at 01:03

## 2021-03-29 RX ADMIN — PROPOFOL 120 MG: 10 INJECTION, EMULSION INTRAVENOUS at 12:03

## 2021-03-29 RX ADMIN — PROMETHAZINE HYDROCHLORIDE 12.5 MG: 25 INJECTION INTRAMUSCULAR; INTRAVENOUS at 02:03

## 2021-03-29 RX ADMIN — HYDROMORPHONE HYDROCHLORIDE 0.5 MG: 2 INJECTION, SOLUTION INTRAMUSCULAR; INTRAVENOUS; SUBCUTANEOUS at 02:03

## 2021-03-29 RX ADMIN — SCOPALAMINE 1 PATCH: 1 PATCH, EXTENDED RELEASE TRANSDERMAL at 11:03

## 2021-03-29 RX ADMIN — NEOSTIGMINE METHYLSULFATE 4 MG: 1 INJECTION INTRAVENOUS at 01:03

## 2021-03-29 RX ADMIN — ROCURONIUM BROMIDE 30 MG: 10 INJECTION, SOLUTION INTRAVENOUS at 12:03

## 2021-03-29 RX ADMIN — DEXAMETHASONE SODIUM PHOSPHATE 8 MG: 4 INJECTION, SOLUTION INTRA-ARTICULAR; INTRALESIONAL; INTRAMUSCULAR; INTRAVENOUS; SOFT TISSUE at 12:03

## 2021-03-29 RX ADMIN — HYDROCODONE BITARTRATE AND ACETAMINOPHEN 1 TABLET: 10; 325 TABLET ORAL at 02:03

## 2021-03-29 RX ADMIN — MIDAZOLAM HYDROCHLORIDE 3 MG: 1 INJECTION, SOLUTION INTRAMUSCULAR; INTRAVENOUS at 12:03

## 2021-03-29 RX ADMIN — KETOROLAC TROMETHAMINE 30 MG: 30 INJECTION, SOLUTION INTRAMUSCULAR; INTRAVENOUS at 01:03

## 2021-03-29 RX ADMIN — FENTANYL CITRATE 100 MCG: 50 INJECTION, SOLUTION INTRAMUSCULAR; INTRAVENOUS at 12:03

## 2021-03-31 LAB
FINAL PATHOLOGIC DIAGNOSIS: NORMAL
GROSS: NORMAL
Lab: NORMAL

## 2021-04-08 PROBLEM — Z90.49 S/P LAPAROSCOPIC CHOLECYSTECTOMY: Status: ACTIVE | Noted: 2021-04-08

## 2021-04-14 NOTE — HPI
"46 yo female with HTN, anxiety and depression and bipolar disorder presented to [facility] ED on 1/26/18 after been found on the floor with decreased level of consciousness by her boyfriend, Ramiro Wiggins.  Pt reports that she fell down some stairs on 1/25/18 after she tripped over her dog.  She went to [facility] ED where head CT and L-spine x-ray were done showing no acute abnormalities.  She was given IM hydromorphone and was discharged home.  Pt reported then that she was seeing and hearing people that were not present.  She admits to taking ibuprofen, gabapentin, tramadol and a "pain pill" she got from her neighbor yesterday.  Pt states, "I'm not a drug addict."  PCP Dr. Gilda Aburto. Psychiatrist Dr. Fountain.      Prescription Monitoring Program report reveals pt is prescribed Tramadol 50 mg BID and was prescribed #30 on 1/22/18.  She was prescribed zolpidem tartrate 10 mg nightly, #30 on 1/22/18 and alprazolam 2 mg TID on 1/2/2018. She states she drinks alcohol occasionally, none in the past few days. Denies use of tobacco or illicit drugs.      Work-up in ED: Pt initially hypotensive, improved with IVF.  Creatinine 6.2, BUN 67, CK 32,863, , . Drug screen positive for benzos and opioids.  US Abdomen done for elevated LFTs: prominent common duct, noting that it was same dilated size on 7/25/2014.  CT abd/pelvis: gallbladder distention and mild gallbladder wall thickening. Trace ascites over hepatic dome.  Pt intermittently unresponsive in ED, improved with narcan infusion.  Admitted to [facility].  [facility] was consulted in ED.    " Alar Island Pedicle Flap Text: The defect edges were debeveled with a #15 scalpel blade.  Given the location of the defect, shape of the defect and the proximity to the alar rim an island pedicle advancement flap was deemed most appropriate.  Using a sterile surgical marker, an appropriate advancement flap was drawn incorporating the defect, outlining the appropriate donor tissue and placing the expected incisions within the nasal ala running parallel to the alar rim. The area thus outlined was incised with a #15 scalpel blade.  The skin margins were undermined minimally to an appropriate distance in all directions around the primary defect and laterally outward around the island pedicle utilizing iris scissors.  There was minimal undermining beneath the pedicle flap.

## 2021-04-17 ENCOUNTER — IMMUNIZATION (OUTPATIENT)
Dept: PRIMARY CARE CLINIC | Facility: CLINIC | Age: 51
End: 2021-04-17

## 2021-04-17 DIAGNOSIS — Z23 NEED FOR VACCINATION: Primary | ICD-10-CM

## 2021-04-20 ENCOUNTER — NURSE TRIAGE (OUTPATIENT)
Dept: ADMINISTRATIVE | Facility: CLINIC | Age: 51
End: 2021-04-20

## 2021-07-12 ENCOUNTER — OUTSIDE PLACE OF SERVICE (OUTPATIENT)
Dept: ADMINISTRATIVE | Facility: OTHER | Age: 51
End: 2021-07-12
Payer: MEDICAID

## 2021-07-12 ENCOUNTER — OUTSIDE PLACE OF SERVICE (OUTPATIENT)
Dept: CARDIOLOGY | Facility: CLINIC | Age: 51
End: 2021-07-12
Payer: MEDICAID

## 2021-07-12 PROCEDURE — 93010 PR ELECTROCARDIOGRAM REPORT: ICD-10-PCS | Mod: ,,, | Performed by: INTERNAL MEDICINE

## 2021-07-12 PROCEDURE — 93010 ELECTROCARDIOGRAM REPORT: CPT | Mod: ,,, | Performed by: INTERNAL MEDICINE

## 2021-11-03 ENCOUNTER — TELEPHONE (OUTPATIENT)
Dept: OBSTETRICS AND GYNECOLOGY | Facility: CLINIC | Age: 51
End: 2021-11-03
Payer: MEDICAID

## 2021-11-05 ENCOUNTER — OFFICE VISIT (OUTPATIENT)
Dept: OBSTETRICS AND GYNECOLOGY | Facility: CLINIC | Age: 51
End: 2021-11-05
Payer: MEDICAID

## 2021-11-05 VITALS
WEIGHT: 160.5 LBS | SYSTOLIC BLOOD PRESSURE: 114 MMHG | DIASTOLIC BLOOD PRESSURE: 64 MMHG | BODY MASS INDEX: 32.36 KG/M2 | HEIGHT: 59 IN

## 2021-11-05 DIAGNOSIS — R10.2 FEMALE PELVIC PAIN: ICD-10-CM

## 2021-11-05 DIAGNOSIS — Z78.0 MENOPAUSE: ICD-10-CM

## 2021-11-05 DIAGNOSIS — Z01.419 WELL WOMAN EXAM WITH ROUTINE GYNECOLOGICAL EXAM: Primary | ICD-10-CM

## 2021-11-05 DIAGNOSIS — Z90.710 STATUS POST ABDOMINAL HYSTERECTOMY: ICD-10-CM

## 2021-11-05 DIAGNOSIS — N89.8 VAGINAL ODOR: ICD-10-CM

## 2021-11-05 PROCEDURE — 99396 PREV VISIT EST AGE 40-64: CPT | Mod: S$PBB,,, | Performed by: OBSTETRICS & GYNECOLOGY

## 2021-11-05 PROCEDURE — 99213 OFFICE O/P EST LOW 20 MIN: CPT | Mod: PBBFAC | Performed by: OBSTETRICS & GYNECOLOGY

## 2021-11-05 PROCEDURE — 99396 PR PREVENTIVE VISIT,EST,40-64: ICD-10-PCS | Mod: S$PBB,,, | Performed by: OBSTETRICS & GYNECOLOGY

## 2021-11-05 PROCEDURE — 87481 CANDIDA DNA AMP PROBE: CPT | Mod: 59 | Performed by: OBSTETRICS & GYNECOLOGY

## 2021-11-05 PROCEDURE — 99999 PR PBB SHADOW E&M-EST. PATIENT-LVL III: CPT | Mod: PBBFAC,,, | Performed by: OBSTETRICS & GYNECOLOGY

## 2021-11-05 PROCEDURE — 99999 PR PBB SHADOW E&M-EST. PATIENT-LVL III: ICD-10-PCS | Mod: PBBFAC,,, | Performed by: OBSTETRICS & GYNECOLOGY

## 2021-11-05 RX ORDER — METHOCARBAMOL 750 MG/1
750 TABLET, FILM COATED ORAL 2 TIMES DAILY
COMMUNITY
Start: 2021-09-24

## 2021-11-05 RX ORDER — TRIAMCINOLONE ACETONIDE 1 MG/G
OINTMENT TOPICAL
COMMUNITY
Start: 2021-09-23

## 2021-11-05 RX ORDER — LEVOCETIRIZINE DIHYDROCHLORIDE 5 MG/1
5 TABLET, FILM COATED ORAL DAILY
COMMUNITY
Start: 2021-10-26

## 2021-11-05 RX ORDER — ESTRADIOL 1 MG/1
1 TABLET ORAL DAILY
Qty: 30 TABLET | Refills: 3 | Status: SHIPPED | OUTPATIENT
Start: 2021-11-05 | End: 2022-03-02

## 2021-11-05 RX ORDER — LISINOPRIL AND HYDROCHLOROTHIAZIDE 20; 25 MG/1; MG/1
1 TABLET ORAL DAILY
COMMUNITY
Start: 2021-09-24

## 2021-11-05 RX ORDER — MUPIROCIN 20 MG/G
OINTMENT TOPICAL
COMMUNITY
Start: 2021-09-23

## 2021-11-17 ENCOUNTER — TELEPHONE (OUTPATIENT)
Dept: OBSTETRICS AND GYNECOLOGY | Facility: CLINIC | Age: 51
End: 2021-11-17
Payer: MEDICAID

## 2021-11-17 LAB
BACTERIAL VAGINOSIS DNA: NEGATIVE
CANDIDA GLABRATA DNA: NEGATIVE
CANDIDA KRUSEI DNA: NEGATIVE
CANDIDA RRNA VAG QL PROBE: NEGATIVE
T VAGINALIS RRNA GENITAL QL PROBE: NEGATIVE

## 2021-12-06 ENCOUNTER — LAB VISIT (OUTPATIENT)
Dept: LAB | Facility: HOSPITAL | Age: 51
End: 2021-12-06
Attending: INTERNAL MEDICINE
Payer: MEDICAID

## 2021-12-06 DIAGNOSIS — E83.9 DISORDER OF MINERAL METABOLISM, UNSPECIFIED: Primary | ICD-10-CM

## 2021-12-06 LAB
ALBUMIN SERPL BCP-MCNC: 4.2 G/DL (ref 3.5–5.2)
ANION GAP SERPL CALC-SCNC: 10 MMOL/L (ref 8–16)
BASOPHILS # BLD AUTO: 0.03 K/UL (ref 0–0.2)
BASOPHILS NFR BLD: 0.8 % (ref 0–1.9)
CALCIUM SERPL-MCNC: 9.2 MG/DL (ref 8.7–10.5)
CHLORIDE SERPL-SCNC: 104 MMOL/L (ref 95–110)
CO2 SERPL-SCNC: 29 MMOL/L (ref 23–29)
CREAT SERPL-MCNC: 0.98 MG/DL (ref 0.5–1.4)
CREAT UR-MCNC: 209.8 MG/DL (ref 15–325)
DIFFERENTIAL METHOD: ABNORMAL
EOSINOPHIL # BLD AUTO: 0.1 K/UL (ref 0–0.5)
EOSINOPHIL NFR BLD: 1.3 % (ref 0–8)
ERYTHROCYTE [DISTWIDTH] IN BLOOD BY AUTOMATED COUNT: 15 % (ref 11.5–14.5)
EST. GFR  (AFRICAN AMERICAN): >60 ML/MIN/1.73 M^2
EST. GFR  (NON AFRICAN AMERICAN): >60 ML/MIN/1.73 M^2
GLUCOSE SERPL-MCNC: 67 MG/DL (ref 70–110)
HCT VFR BLD AUTO: 39.1 % (ref 37–48.5)
HGB BLD-MCNC: 12.2 G/DL (ref 12–16)
IMM GRANULOCYTES # BLD AUTO: 0 K/UL (ref 0–0.04)
IMM GRANULOCYTES NFR BLD AUTO: 0 % (ref 0–0.5)
LYMPHOCYTES # BLD AUTO: 2.3 K/UL (ref 1–4.8)
LYMPHOCYTES NFR BLD: 57.5 % (ref 18–48)
MCH RBC QN AUTO: 26.9 PG (ref 27–31)
MCHC RBC AUTO-ENTMCNC: 31.2 G/DL (ref 32–36)
MCV RBC AUTO: 86 FL (ref 82–98)
MONOCYTES # BLD AUTO: 0.4 K/UL (ref 0.3–1)
MONOCYTES NFR BLD: 9 % (ref 4–15)
NEUTROPHILS # BLD AUTO: 1.3 K/UL (ref 1.8–7.7)
NEUTROPHILS NFR BLD: 31.4 % (ref 38–73)
NRBC BLD-RTO: 0 /100 WBC
PHOSPHATE SERPL-MCNC: 4 MG/DL (ref 2.7–4.5)
PLATELET # BLD AUTO: 361 K/UL (ref 150–450)
PMV BLD AUTO: 11.3 FL (ref 9.2–12.9)
POTASSIUM SERPL-SCNC: 3.8 MMOL/L (ref 3.5–5.1)
PROT UR-MCNC: 11 MG/DL (ref 0–15)
PROT/CREAT UR: 0.05 MG/G{CREAT} (ref 0–0.2)
RBC # BLD AUTO: 4.53 M/UL (ref 4–5.4)
SODIUM SERPL-SCNC: 143 MMOL/L (ref 136–145)
UUN UR-MCNC: 18 MG/DL (ref 7–17)
WBC # BLD AUTO: 4 K/UL (ref 3.9–12.7)

## 2021-12-06 PROCEDURE — 82570 ASSAY OF URINE CREATININE: CPT | Performed by: INTERNAL MEDICINE

## 2021-12-06 PROCEDURE — 85025 COMPLETE CBC W/AUTO DIFF WBC: CPT | Mod: PO | Performed by: INTERNAL MEDICINE

## 2021-12-06 PROCEDURE — 36415 COLL VENOUS BLD VENIPUNCTURE: CPT | Mod: PO | Performed by: INTERNAL MEDICINE

## 2021-12-06 PROCEDURE — 80069 RENAL FUNCTION PANEL: CPT | Mod: PO | Performed by: INTERNAL MEDICINE

## 2022-06-10 DIAGNOSIS — Z12.31 ENCOUNTER FOR SCREENING MAMMOGRAM FOR MALIGNANT NEOPLASM OF BREAST: Primary | ICD-10-CM

## 2022-12-07 ENCOUNTER — HOSPITAL ENCOUNTER (EMERGENCY)
Facility: HOSPITAL | Age: 52
Discharge: LEFT BEFORE DISCHARGE | End: 2022-12-07
Payer: MEDICAID

## 2022-12-07 VITALS
HEART RATE: 111 BPM | RESPIRATION RATE: 18 BRPM | OXYGEN SATURATION: 100 % | DIASTOLIC BLOOD PRESSURE: 79 MMHG | TEMPERATURE: 98 F | HEIGHT: 59 IN | SYSTOLIC BLOOD PRESSURE: 118 MMHG | BODY MASS INDEX: 31.25 KG/M2 | WEIGHT: 155 LBS

## 2022-12-07 DIAGNOSIS — M54.9 BACK PAIN: ICD-10-CM

## 2022-12-07 PROCEDURE — 99281 EMR DPT VST MAYX REQ PHY/QHP: CPT | Mod: ER

## 2022-12-07 RX ORDER — ACETAMINOPHEN 500 MG
1000 TABLET ORAL
Status: DISCONTINUED | OUTPATIENT
Start: 2022-12-07 | End: 2022-12-07 | Stop reason: HOSPADM

## 2022-12-07 NOTE — ED NOTES
Pt. Reports she need to must leave and go  grand kids. Provider notified. Pt. Advised to stay and receive treatment. Pt. Has Left AMA that this time.

## 2022-12-28 DIAGNOSIS — M54.30 SCIATICA: Primary | ICD-10-CM

## 2023-01-11 ENCOUNTER — TELEPHONE (OUTPATIENT)
Dept: OBSTETRICS AND GYNECOLOGY | Facility: CLINIC | Age: 53
End: 2023-01-11
Payer: MEDICAID

## 2023-01-11 NOTE — TELEPHONE ENCOUNTER
Left voicemail for pt to call the office back if the apt time sis not work for her. Apt was rescheduled from 02/15/2023 to 01/25/2023. That is the earliest he have open.               ----- Message from Danyel Jean sent at 1/11/2023  4:15 PM CST -----  Regarding: self [phone]  .Type:  Sooner Appointment Request    Patient is requesting a sooner appointment.  Patient declined first available appointment listed as well as another facility and provider .  Patient will not accept being placed on the waitlist and is requesting a message be sent to doctor.    Name of Caller:self    When is the first available appointment?2/15/2023    Symptoms: discharge- brown    Would the patient rather a call back or a response via [facility]? Call back     Best Call Back Number: [phone]      Additional Information: patient would like a sooner appt as soon as possible

## 2023-01-19 ENCOUNTER — TELEPHONE (OUTPATIENT)
Dept: OBSTETRICS AND GYNECOLOGY | Facility: CLINIC | Age: 53
End: 2023-01-19
Payer: MEDICAID

## 2023-01-19 DIAGNOSIS — B96.89 BACTERIAL VAGINOSIS: Primary | ICD-10-CM

## 2023-01-19 DIAGNOSIS — N76.0 BACTERIAL VAGINOSIS: Primary | ICD-10-CM

## 2023-01-19 RX ORDER — METRONIDAZOLE 500 MG/1
500 TABLET ORAL EVERY 12 HOURS
Qty: 14 TABLET | Refills: 0 | Status: SHIPPED | OUTPATIENT
Start: 2023-01-19

## 2023-01-19 RX ORDER — METRONIDAZOLE 500 MG/1
500 TABLET ORAL EVERY 12 HOURS
Qty: 14 TABLET | Refills: 0 | Status: SHIPPED | OUTPATIENT
Start: 2023-01-19 | End: 2023-01-19 | Stop reason: SDUPTHER

## 2023-01-19 NOTE — TELEPHONE ENCOUNTER
----- Message from Elvira Ponce LPN sent at 1/19/2023 11:09 AM CST -----  Regarding: medication  Good Morning      Pt has not been seen since 2021. Pt has a scheduled appointment on 1/25/23 for bv. Pt called and ask for boric acid and flagyl. I told her that should need to wait until her appointment. Do you want to send her the Rx or have her wait until her scheduled appointment?    Pt preferred pharmacy is    [facility]  [place]

## 2023-01-19 NOTE — TELEPHONE ENCOUNTER
Metronidazole and boric acid suppositories prescribed for bacterial vaginosis per patient's request

## 2023-01-19 NOTE — TELEPHONE ENCOUNTER
Pt call was accepted from the call center. Pt asking for Boric Acid suppositories. Explained to pt that she has not been seen since 2021 and that she would need an appointment. Pt appointment scheduled for 1/25/23.

## 2023-03-26 ENCOUNTER — HOSPITAL ENCOUNTER (EMERGENCY)
Facility: HOSPITAL | Age: 53
Discharge: HOME | End: 2023-03-26
Attending: EMERGENCY MEDICINE
Payer: MEDICAID

## 2023-03-26 VITALS
BODY MASS INDEX: 31.04 KG/M2 | RESPIRATION RATE: 20 BRPM | TEMPERATURE: 99 F | WEIGHT: 154 LBS | HEART RATE: 87 BPM | HEIGHT: 59 IN | SYSTOLIC BLOOD PRESSURE: 111 MMHG | OXYGEN SATURATION: 99 % | DIASTOLIC BLOOD PRESSURE: 77 MMHG

## 2023-03-26 DIAGNOSIS — S61.213A LACERATION OF LEFT MIDDLE FINGER WITHOUT FOREIGN BODY WITHOUT DAMAGE TO NAIL, INITIAL ENCOUNTER: Primary | ICD-10-CM

## 2023-03-26 PROCEDURE — 25000003 PHARM REV CODE 250: Mod: ER | Performed by: PHYSICIAN ASSISTANT

## 2023-03-26 PROCEDURE — 99283 EMERGENCY DEPT VISIT LOW MDM: CPT | Mod: ER

## 2023-03-26 PROCEDURE — 12001 RPR S/N/AX/GEN/TRNK 2.5CM/<: CPT | Mod: ER

## 2023-03-26 RX ORDER — LIDOCAINE HYDROCHLORIDE 10 MG/ML
5 INJECTION, SOLUTION EPIDURAL; INFILTRATION; INTRACAUDAL; PERINEURAL
Status: COMPLETED | OUTPATIENT
Start: 2023-03-26 | End: 2023-03-26

## 2023-03-26 RX ORDER — DICLOFENAC SODIUM 50 MG/1
50 TABLET, DELAYED RELEASE ORAL 2 TIMES DAILY
Qty: 20 TABLET | Refills: 0 | Status: SHIPPED | OUTPATIENT
Start: 2023-03-26

## 2023-03-26 RX ADMIN — LIDOCAINE HYDROCHLORIDE 50 MG: 10 INJECTION, SOLUTION EPIDURAL; INFILTRATION; INTRACAUDAL; PERINEURAL at 06:03

## 2023-03-26 NOTE — DISCHARGE INSTRUCTIONS
Thank you for letting me care for you today! It was nice meeting you, and I hope you feel better soon.   Please come back to [facility] for all of your future medical needs.     Our goal in the emergency department is to always give you outstanding care and exceptional service. You may receive a survey by text, mail, or e-mail in the next few days regarding your experience in our ED. We would greatly appreciate you completing and returning the survey. Your feedback provides us with a way to recognize our staff who give very good care and it helps us learn how to improve when your experience was below our aspiration of excellence.      Sincerely,  Estela Post PA-C
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
## 2023-04-20 DIAGNOSIS — Z12.31 ENCOUNTER FOR SCREENING MAMMOGRAM FOR MALIGNANT NEOPLASM OF BREAST: Primary | ICD-10-CM

## 2023-08-08 DIAGNOSIS — M54.6 PAIN IN THORACIC SPINE: Primary | ICD-10-CM

## 2023-08-09 ENCOUNTER — HOSPITAL ENCOUNTER (OUTPATIENT)
Dept: RADIOLOGY | Facility: HOSPITAL | Age: 53
Discharge: HOME | End: 2023-08-09
Attending: NURSE PRACTITIONER
Payer: MEDICAID

## 2023-08-09 DIAGNOSIS — M54.6 PAIN IN THORACIC SPINE: ICD-10-CM

## 2023-08-09 PROCEDURE — 71101 X-RAY EXAM UNILAT RIBS/CHEST: CPT | Mod: TC,FY,PO,RT

## 2023-08-09 PROCEDURE — 72070 X-RAY EXAM THORAC SPINE 2VWS: CPT | Mod: TC,FY,PO

## 2023-08-09 PROCEDURE — 72070 XR THORACIC SPINE AP LATERAL: ICD-10-PCS | Mod: 26,,, | Performed by: RADIOLOGY

## 2023-08-09 PROCEDURE — 71101 XR RIBS MIN 3 VIEWS W/ PA CHEST RIGHT: ICD-10-PCS | Mod: 26,RT,, | Performed by: RADIOLOGY

## 2023-08-09 PROCEDURE — 72070 X-RAY EXAM THORAC SPINE 2VWS: CPT | Mod: 26,,, | Performed by: RADIOLOGY

## 2023-08-09 PROCEDURE — 71101 X-RAY EXAM UNILAT RIBS/CHEST: CPT | Mod: 26,RT,, | Performed by: RADIOLOGY

## 2024-05-14 ENCOUNTER — HOSPITAL ENCOUNTER (EMERGENCY)
Facility: HOSPITAL | Age: 54
Discharge: HOME | End: 2024-05-14
Attending: EMERGENCY MEDICINE
Payer: MEDICAID

## 2024-05-14 VITALS
WEIGHT: 145 LBS | BODY MASS INDEX: 29.23 KG/M2 | DIASTOLIC BLOOD PRESSURE: 78 MMHG | HEIGHT: 59 IN | HEART RATE: 105 BPM | RESPIRATION RATE: 18 BRPM | OXYGEN SATURATION: 100 % | TEMPERATURE: 99 F | SYSTOLIC BLOOD PRESSURE: 119 MMHG

## 2024-05-14 DIAGNOSIS — G44.209 ACUTE NON INTRACTABLE TENSION-TYPE HEADACHE: ICD-10-CM

## 2024-05-14 DIAGNOSIS — J06.9 VIRAL URI: Primary | ICD-10-CM

## 2024-05-14 LAB
GROUP A STREP, MOLECULAR: NEGATIVE
INFLUENZA A, MOLECULAR: NEGATIVE
INFLUENZA B, MOLECULAR: NEGATIVE
SARS-COV-2 RDRP RESP QL NAA+PROBE: NEGATIVE
SPECIMEN SOURCE: NORMAL

## 2024-05-14 PROCEDURE — 87502 INFLUENZA DNA AMP PROBE: CPT | Mod: ER | Performed by: EMERGENCY MEDICINE

## 2024-05-14 PROCEDURE — 87651 STREP A DNA AMP PROBE: CPT | Mod: ER | Performed by: EMERGENCY MEDICINE

## 2024-05-14 PROCEDURE — 99284 EMERGENCY DEPT VISIT MOD MDM: CPT | Mod: ER

## 2024-05-14 PROCEDURE — U0002 COVID-19 LAB TEST NON-CDC: HCPCS | Mod: ER | Performed by: EMERGENCY MEDICINE

## 2024-05-14 PROCEDURE — 25000003 PHARM REV CODE 250: Mod: ER

## 2024-05-14 RX ORDER — BUTALBITAL, ACETAMINOPHEN AND CAFFEINE 50; 325; 40 MG/1; MG/1; MG/1
1 TABLET ORAL
Status: COMPLETED | OUTPATIENT
Start: 2024-05-14 | End: 2024-05-14

## 2024-05-14 RX ORDER — NAPROXEN 250 MG/1
500 TABLET ORAL
Status: DISCONTINUED | OUTPATIENT
Start: 2024-05-14 | End: 2024-05-14

## 2024-05-14 RX ORDER — FLUTICASONE PROPIONATE 50 MCG
1 SPRAY, SUSPENSION (ML) NASAL DAILY
Qty: 11.1 ML | Refills: 0 | Status: SHIPPED | OUTPATIENT
Start: 2024-05-14

## 2024-05-14 RX ADMIN — BUTALBITAL, ACETAMINOPHEN, AND CAFFEINE 1 TABLET: 50; 325; 40 TABLET ORAL at 11:05

## 2024-05-15 NOTE — DISCHARGE INSTRUCTIONS
Ms. Brewer,     Below are suggestions for symptomatic relief:              - Tylenol every 4 hours OR ibuprofen every 6 hours as needed for pain/fever.              - Salt water gargles to soothe throat pain.              - Chloroseptic spray also helps to numb throat pain.              - Nasal saline spray reduces inflammation and dryness.              - Warm face compresses to help with facial sinus pain/pressure.              - Nasim's vapor rub at night.              - Flonase OTC or Nasocort OTC for nasal congestion.              - Simple foods like bread, rice, soup.              - Delsym helps with coughing at night              - Zyrtec/Claritin during the day & Benadryl at night may help with allergies.     If you DO HAVE hypertension or palpitations, it is safe to take Coricidin HBP for relief of sinus symptoms.     If you DO NOT HAVE hypertension or any history of palpitations, it is ok to take over the counter Sudafed or Mucinex D or Allegra-D or Claritin-D or Zyrtec-D.  If you do take one of the above, it is ok to combine that with plain over the counter Mucinex or Allegra or Claritin or Zyrtec.   If, for example, you are taking Zyrtec-D, you can combine that with Mucinex, but not Mucinex-D.  If you are taking Mucinex-D, you can combine that with plain Allegra or Claritin or Zyrtec.      Follow up with your primary care provider within 2-5 days if your symptoms have not improved.      Thank you for letting me care for you today! It was nice meeting you, and I hope you feel better soon.   If you would like access to your chart and what was done today please utilize the [facility].   Please don't hesitate to return if your symptoms worsen or you develop any other worrisome symptoms.    Our goal in the emergency department is to always give you outstanding care and exceptional service. You may receive a survey by mail or e-mail in the next week regarding your experience in our ED. We would greatly  appreciate you completing and returning the survey. Your feedback provides us with a way to recognize our staff who give very good care and it helps us learn how to improve when your experience was below our aspiration of excellence.     Sincerely,    Lexus Villar PA-C  Emergency Department Physician Assistant  [facility]
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
## 2024-07-16 ENCOUNTER — LAB VISIT (OUTPATIENT)
Dept: LAB | Facility: HOSPITAL | Age: 54
End: 2024-07-16
Attending: NURSE PRACTITIONER
Payer: MEDICAID

## 2024-07-16 DIAGNOSIS — E55.9 VITAMIN D DEFICIENCY: ICD-10-CM

## 2024-07-16 DIAGNOSIS — I10 ESSENTIAL (PRIMARY) HYPERTENSION: Primary | ICD-10-CM

## 2024-07-16 LAB
ALBUMIN SERPL BCP-MCNC: 4.8 G/DL (ref 3.5–5.2)
ALP SERPL-CCNC: 79 U/L (ref 38–126)
ALT SERPL W/O P-5'-P-CCNC: 29 U/L (ref 10–44)
ANION GAP SERPL CALC-SCNC: 14 MMOL/L (ref 8–16)
AST SERPL-CCNC: 78 U/L (ref 15–46)
BASOPHILS # BLD AUTO: 0.02 K/UL (ref 0–0.2)
BASOPHILS NFR BLD: 0.5 % (ref 0–1.9)
BILIRUB SERPL-MCNC: 0.5 MG/DL (ref 0.1–1)
CALCIUM SERPL-MCNC: 10 MG/DL (ref 8.7–10.5)
CHLORIDE SERPL-SCNC: 101 MMOL/L (ref 95–110)
CHOLEST SERPL-MCNC: 296 MG/DL (ref 120–199)
CHOLEST/HDLC SERPL: 3.4 {RATIO} (ref 2–5)
CO2 SERPL-SCNC: 24 MMOL/L (ref 23–29)
CREAT SERPL-MCNC: 1.08 MG/DL (ref 0.5–1.4)
DIFFERENTIAL METHOD BLD: ABNORMAL
EOSINOPHIL # BLD AUTO: 0 K/UL (ref 0–0.5)
EOSINOPHIL NFR BLD: 0.5 % (ref 0–8)
ERYTHROCYTE [DISTWIDTH] IN BLOOD BY AUTOMATED COUNT: 13.2 % (ref 11.5–14.5)
EST. GFR  (NO RACE VARIABLE): >60 ML/MIN/1.73 M^2
GLUCOSE SERPL-MCNC: 81 MG/DL (ref 70–110)
HCT VFR BLD AUTO: 39.9 % (ref 37–48.5)
HDLC SERPL-MCNC: 88 MG/DL (ref 40–75)
HDLC SERPL: 29.7 % (ref 20–50)
HGB BLD-MCNC: 13.1 G/DL (ref 12–16)
IMM GRANULOCYTES # BLD AUTO: 0 K/UL (ref 0–0.04)
IMM GRANULOCYTES NFR BLD AUTO: 0 % (ref 0–0.5)
LDLC SERPL CALC-MCNC: 196.8 MG/DL (ref 63–159)
LYMPHOCYTES # BLD AUTO: 1.6 K/UL (ref 1–4.8)
LYMPHOCYTES NFR BLD: 43.1 % (ref 18–48)
MCH RBC QN AUTO: 27.3 PG (ref 27–31)
MCHC RBC AUTO-ENTMCNC: 32.8 G/DL (ref 32–36)
MCV RBC AUTO: 83 FL (ref 82–98)
MONOCYTES # BLD AUTO: 0.3 K/UL (ref 0.3–1)
MONOCYTES NFR BLD: 8.1 % (ref 4–15)
NEUTROPHILS # BLD AUTO: 1.8 K/UL (ref 1.8–7.7)
NEUTROPHILS NFR BLD: 47.8 % (ref 38–73)
NONHDLC SERPL-MCNC: 208 MG/DL
NRBC BLD-RTO: 0 /100 WBC
PLATELET # BLD AUTO: 360 K/UL (ref 150–450)
PMV BLD AUTO: 11.1 FL (ref 9.2–12.9)
POTASSIUM SERPL-SCNC: 3.4 MMOL/L (ref 3.5–5.1)
PROT SERPL-MCNC: 8.4 G/DL (ref 6–8.4)
RBC # BLD AUTO: 4.79 M/UL (ref 4–5.4)
SODIUM SERPL-SCNC: 139 MMOL/L (ref 136–145)
T4 FREE SERPL-MCNC: 0.85 NG/DL (ref 0.71–1.51)
TRIGL SERPL-MCNC: 56 MG/DL (ref 30–150)
TSH SERPL DL<=0.005 MIU/L-ACNC: <0.026 UIU/ML (ref 0.4–4)
UUN UR-MCNC: 23 MG/DL (ref 7–17)
WBC # BLD AUTO: 3.71 K/UL (ref 3.9–12.7)

## 2024-07-16 PROCEDURE — 80053 COMPREHEN METABOLIC PANEL: CPT | Mod: PN | Performed by: NURSE PRACTITIONER

## 2024-07-16 PROCEDURE — 82306 VITAMIN D 25 HYDROXY: CPT | Mod: PN | Performed by: NURSE PRACTITIONER

## 2024-07-16 PROCEDURE — 80061 LIPID PANEL: CPT | Performed by: NURSE PRACTITIONER

## 2024-07-16 PROCEDURE — 85025 COMPLETE CBC W/AUTO DIFF WBC: CPT | Mod: PN | Performed by: NURSE PRACTITIONER

## 2024-07-16 PROCEDURE — 84439 ASSAY OF FREE THYROXINE: CPT | Performed by: NURSE PRACTITIONER

## 2024-07-16 PROCEDURE — 84443 ASSAY THYROID STIM HORMONE: CPT | Mod: PN | Performed by: NURSE PRACTITIONER

## 2024-07-16 PROCEDURE — 36415 COLL VENOUS BLD VENIPUNCTURE: CPT | Mod: PN | Performed by: NURSE PRACTITIONER

## 2024-07-17 LAB — 25(OH)D3+25(OH)D2 SERPL-MCNC: 26 NG/ML (ref 30–96)

## 2024-08-14 ENCOUNTER — HOSPITAL ENCOUNTER (EMERGENCY)
Facility: HOSPITAL | Age: 54
Discharge: HOME | End: 2024-08-14
Attending: STUDENT IN AN ORGANIZED HEALTH CARE EDUCATION/TRAINING PROGRAM
Payer: MEDICAID

## 2024-08-14 VITALS
OXYGEN SATURATION: 100 % | DIASTOLIC BLOOD PRESSURE: 62 MMHG | SYSTOLIC BLOOD PRESSURE: 122 MMHG | RESPIRATION RATE: 17 BRPM | TEMPERATURE: 98 F | HEIGHT: 59 IN | WEIGHT: 145 LBS | BODY MASS INDEX: 29.23 KG/M2 | HEART RATE: 82 BPM

## 2024-08-14 DIAGNOSIS — S92.515A CLOSED NONDISPLACED FRACTURE OF PROXIMAL PHALANX OF LESSER TOE OF LEFT FOOT, INITIAL ENCOUNTER: ICD-10-CM

## 2024-08-14 DIAGNOSIS — S92.415B OPEN NONDISPLACED FRACTURE OF PROXIMAL PHALANX OF LEFT GREAT TOE, INITIAL ENCOUNTER: Primary | ICD-10-CM

## 2024-08-14 PROCEDURE — 96365 THER/PROPH/DIAG IV INF INIT: CPT | Mod: ER

## 2024-08-14 PROCEDURE — 99284 EMERGENCY DEPT VISIT MOD MDM: CPT | Mod: 25,ER

## 2024-08-14 PROCEDURE — 90471 IMMUNIZATION ADMIN: CPT | Mod: ER

## 2024-08-14 PROCEDURE — 12001 RPR S/N/AX/GEN/TRNK 2.5CM/<: CPT | Mod: ER

## 2024-08-14 PROCEDURE — 63600175 PHARM REV CODE 636 W HCPCS: Mod: ER

## 2024-08-14 PROCEDURE — 25000003 PHARM REV CODE 250: Mod: ER

## 2024-08-14 PROCEDURE — 96366 THER/PROPH/DIAG IV INF ADDON: CPT | Mod: ER

## 2024-08-14 PROCEDURE — 90715 TDAP VACCINE 7 YRS/> IM: CPT | Mod: ER

## 2024-08-14 PROCEDURE — 96367 TX/PROPH/DG ADDL SEQ IV INF: CPT | Mod: ER

## 2024-08-14 PROCEDURE — 96375 TX/PRO/DX INJ NEW DRUG ADDON: CPT | Mod: ER

## 2024-08-14 RX ORDER — DOXYCYCLINE 100 MG/1
100 CAPSULE ORAL 2 TIMES DAILY
Qty: 20 CAPSULE | Refills: 0 | Status: SHIPPED | OUTPATIENT
Start: 2024-08-14 | End: 2024-08-24

## 2024-08-14 RX ORDER — LIDOCAINE HYDROCHLORIDE 10 MG/ML
5 INJECTION, SOLUTION EPIDURAL; INFILTRATION; INTRACAUDAL; PERINEURAL
Status: COMPLETED | OUTPATIENT
Start: 2024-08-14 | End: 2024-08-14

## 2024-08-14 RX ORDER — ACETAMINOPHEN 500 MG
1000 TABLET ORAL 4 TIMES DAILY
Qty: 112 TABLET | Refills: 0 | Status: SHIPPED | OUTPATIENT
Start: 2024-08-14 | End: 2024-08-28

## 2024-08-14 RX ORDER — OXYCODONE HYDROCHLORIDE 5 MG/1
5 TABLET ORAL
Status: COMPLETED | OUTPATIENT
Start: 2024-08-14 | End: 2024-08-14

## 2024-08-14 RX ORDER — NAPROXEN 250 MG/1
500 TABLET ORAL
Status: COMPLETED | OUTPATIENT
Start: 2024-08-14 | End: 2024-08-14

## 2024-08-14 RX ORDER — AMOXICILLIN AND CLAVULANATE POTASSIUM 875; 125 MG/1; MG/1
1 TABLET, FILM COATED ORAL 2 TIMES DAILY
Qty: 20 TABLET | Refills: 0 | Status: SHIPPED | OUTPATIENT
Start: 2024-08-14 | End: 2024-08-16

## 2024-08-14 RX ORDER — METRONIDAZOLE 500 MG/100ML
500 INJECTION, SOLUTION INTRAVENOUS
Status: COMPLETED | OUTPATIENT
Start: 2024-08-14 | End: 2024-08-14

## 2024-08-14 RX ORDER — OXYCODONE HYDROCHLORIDE 5 MG/1
5 TABLET ORAL EVERY 4 HOURS PRN
Qty: 12 TABLET | Refills: 0 | Status: SHIPPED | OUTPATIENT
Start: 2024-08-14 | End: 2024-08-17

## 2024-08-14 RX ORDER — ACETAMINOPHEN 500 MG
1000 TABLET ORAL
Status: COMPLETED | OUTPATIENT
Start: 2024-08-14 | End: 2024-08-14

## 2024-08-14 RX ORDER — IBUPROFEN 800 MG/1
800 TABLET ORAL EVERY 6 HOURS
Qty: 120 TABLET | Refills: 0 | Status: SHIPPED | OUTPATIENT
Start: 2024-08-14 | End: 2024-09-13

## 2024-08-14 RX ORDER — MORPHINE SULFATE 4 MG/ML
2 INJECTION, SOLUTION INTRAMUSCULAR; INTRAVENOUS
Status: COMPLETED | OUTPATIENT
Start: 2024-08-14 | End: 2024-08-14

## 2024-08-14 RX ADMIN — METRONIDAZOLE 500 MG: 5 INJECTION, SOLUTION INTRAVENOUS at 05:08

## 2024-08-14 RX ADMIN — MORPHINE SULFATE 2 MG: 4 INJECTION INTRAVENOUS at 06:08

## 2024-08-14 RX ADMIN — ACETAMINOPHEN 1000 MG: 500 TABLET ORAL at 03:08

## 2024-08-14 RX ADMIN — NAPROXEN 500 MG: 250 TABLET ORAL at 03:08

## 2024-08-14 RX ADMIN — OXYCODONE 5 MG: 5 TABLET ORAL at 05:08

## 2024-08-14 RX ADMIN — LIDOCAINE HYDROCHLORIDE 50 MG: 10 INJECTION, SOLUTION EPIDURAL; INFILTRATION; INTRACAUDAL at 05:08

## 2024-08-14 RX ADMIN — DEXTROSE MONOHYDRATE 1 G: 2.5 INJECTION INTRAVENOUS at 05:08

## 2024-08-14 RX ADMIN — TETANUS TOXOID, REDUCED DIPHTHERIA TOXOID AND ACELLULAR PERTUSSIS VACCINE, ADSORBED 0.5 ML: 5; 2.5; 8; 8; 2.5 SUSPENSION INTRAMUSCULAR at 03:08
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
## 2024-08-14 NOTE — Clinical Note
"Shanthi "Donte Brewer was seen and treated in our emergency department on 8/14/2024.  She may return to work on 08/20/2024.       If you have any questions or concerns, please don't hesitate to call.      Sherie Shah PA-C"

## 2024-08-14 NOTE — DISCHARGE INSTRUCTIONS
Please take the prescribed anti-inflammatory medication as scheduled.  Please rest, ice, compress, and elevate your injury, as this may help to relieve pain.  If this is a new injury, your pain may be worse tomorrow.      SPLINT INSTRUCTIONS: Keep the splint clean and dry at all times; do not insert anything into the splint.  If the splint gets wet, it will no longer be effective and you will need to get it replaced immediately.    Start taking antibiotics as prescribed, and continue taking medication until the entire prescription has been completed. Avoid using drugs/alcohol while on antibiotics, and for the next 72 hours after finishing the prescription. Obtain an appointment with your primary care provider or return to the ED for any symptoms of worsening infection, including fever, nausea/vomiting or inability to take the medication, antibiotic side effects, allergic reaction, or any other concerns.    You have been prescribed Ibuprofen for pain. This is an Non-Steroidal Anti-Inflammatory (NSAID) Medication. Please do not take any additional NSAIDs while you are taking this medication including (Advil, Aleve, Motrin, Ibuprofen, Mobic\meloxicam, Naprosyn, Toradol, ketoralac, etc.). Please stop taking this medication if you experience: weakness, itching, yellow skin or eyes, joint pains, vomiting blood, blood or black stools, unusual weight gain, or swelling in your arms, legs, hands, or feet.     You have been prescribed  Oxycodone . Please do not take this medication while working, drinking alcohol, swimming, or while driving/operating heavy machinery. This medication may cause drowsiness, dizziness, impair judgment, and reduce physical capabilities.You should not drive, operate heavy machinery, or make life changing decisions while taking this medication.    While in the Emergency Department you received medication that may cause drowsiness, dizziness, impaired judgment, and reduced physical capabilities. You  should not drive, operate heavy machinery, swim, or make life changing decisions within 24 hours of receiving this medication.

## 2024-08-15 ENCOUNTER — TELEPHONE (OUTPATIENT)
Dept: PODIATRY | Facility: CLINIC | Age: 54
End: 2024-08-15
Payer: MEDICAID

## 2024-08-15 NOTE — TELEPHONE ENCOUNTER
----- Message from Cat Zabala sent at 8/15/2024 10:09 AM CDT -----  Appointment Request    Name of Caller:Shanthi  Symptoms or reason for appointment:Broken toe, L foot  Best Call Back Number:[phone]  Additional Information: Pt was in the ER with a fractured big toe and saw Dr. Bruce. She has a referral in [facility]. Medicaid. Unable to schedule.

## 2024-08-15 NOTE — TELEPHONE ENCOUNTER
Called pt back and explained to her that she has to call Kiel Degroot, he is able to see NP Medicaid. Patient ask me if she can go to [facility], told her she sure can and [facility] can [facility]. Explained the Medicaid situation here. Patient went to the [facility] last night at 2:30 am , they called Dr. Bruce, she talked over the phone to them

## 2024-08-16 ENCOUNTER — HOSPITAL ENCOUNTER (EMERGENCY)
Facility: HOSPITAL | Age: 54
Discharge: HOME | End: 2024-08-16
Attending: EMERGENCY MEDICINE
Payer: MEDICAID

## 2024-08-16 VITALS
WEIGHT: 152 LBS | BODY MASS INDEX: 30.64 KG/M2 | DIASTOLIC BLOOD PRESSURE: 67 MMHG | HEART RATE: 109 BPM | RESPIRATION RATE: 18 BRPM | TEMPERATURE: 99 F | OXYGEN SATURATION: 96 % | SYSTOLIC BLOOD PRESSURE: 108 MMHG | HEIGHT: 59 IN

## 2024-08-16 DIAGNOSIS — S92.911D: Primary | ICD-10-CM

## 2024-08-16 PROCEDURE — 99283 EMERGENCY DEPT VISIT LOW MDM: CPT

## 2024-08-16 RX ORDER — AMOXICILLIN AND CLAVULANATE POTASSIUM 875; 125 MG/1; MG/1
1 TABLET, FILM COATED ORAL 2 TIMES DAILY
Qty: 20 TABLET | Refills: 0 | Status: SHIPPED | OUTPATIENT
Start: 2024-08-16 | End: 2024-08-26

## 2024-08-16 NOTE — DISCHARGE INSTRUCTIONS
Call [facility] to schedule an appointment. Phone numbers and doctors are listed below. Return to the ER sooner for severe pain, signs of infection, fever, any new concern/complaint.    Sivan Talavera, DPM Podiatry  [address] [place], [address]  [phone]    Ivelisse De Leon, DPM Podiatry  [address] [place], [address]  [phone]    Hugo Gray, DPM   Podiatry  [address] [place] [address], [address]  [phone]  [address] [place], [address]  [phone]    Cj Holland Jr., DPM   Podiatry  [address] [place], [address]  [phone]  [address] [place], [address]  [phone]    Helga Soriano, DPM   Podiatry  [address] [place], [address]  [phone]
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
## 2024-08-20 DIAGNOSIS — S92.415B OPEN NONDISPLACED FRACTURE OF PROXIMAL PHALANX OF LEFT GREAT TOE, INITIAL ENCOUNTER: Primary | ICD-10-CM

## 2024-10-01 ENCOUNTER — OFFICE VISIT (OUTPATIENT)
Dept: OBSTETRICS AND GYNECOLOGY | Facility: CLINIC | Age: 54
End: 2024-10-01
Payer: MEDICAID

## 2024-10-01 VITALS
WEIGHT: 147.69 LBS | SYSTOLIC BLOOD PRESSURE: 114 MMHG | DIASTOLIC BLOOD PRESSURE: 62 MMHG | BODY MASS INDEX: 29.83 KG/M2

## 2024-10-01 DIAGNOSIS — Z90.710 HISTORY OF TOTAL HYSTERECTOMY: ICD-10-CM

## 2024-10-01 DIAGNOSIS — Z12.31 BREAST CANCER SCREENING BY MAMMOGRAM: ICD-10-CM

## 2024-10-01 DIAGNOSIS — N89.8 VAGINAL DISCHARGE: Primary | ICD-10-CM

## 2024-10-01 DIAGNOSIS — Z01.419 ROUTINE GYNECOLOGICAL EXAMINATION: ICD-10-CM

## 2024-10-01 PROCEDURE — 4010F ACE/ARB THERAPY RXD/TAKEN: CPT | Mod: CPTII,,, | Performed by: OBSTETRICS & GYNECOLOGY

## 2024-10-01 PROCEDURE — 87591 N.GONORRHOEAE DNA AMP PROB: CPT | Performed by: OBSTETRICS & GYNECOLOGY

## 2024-10-01 PROCEDURE — 99213 OFFICE O/P EST LOW 20 MIN: CPT | Mod: PBBFAC | Performed by: OBSTETRICS & GYNECOLOGY

## 2024-10-01 PROCEDURE — 99396 PREV VISIT EST AGE 40-64: CPT | Mod: S$PBB,,, | Performed by: OBSTETRICS & GYNECOLOGY

## 2024-10-01 PROCEDURE — 3008F BODY MASS INDEX DOCD: CPT | Mod: CPTII,,, | Performed by: OBSTETRICS & GYNECOLOGY

## 2024-10-01 PROCEDURE — 3074F SYST BP LT 130 MM HG: CPT | Mod: CPTII,,, | Performed by: OBSTETRICS & GYNECOLOGY

## 2024-10-01 PROCEDURE — 99999 PR PBB SHADOW E&M-EST. PATIENT-LVL III: CPT | Mod: PBBFAC,,, | Performed by: OBSTETRICS & GYNECOLOGY

## 2024-10-01 PROCEDURE — 87491 CHLMYD TRACH DNA AMP PROBE: CPT | Performed by: OBSTETRICS & GYNECOLOGY

## 2024-10-01 PROCEDURE — 1159F MED LIST DOCD IN RCRD: CPT | Mod: CPTII,,, | Performed by: OBSTETRICS & GYNECOLOGY

## 2024-10-01 PROCEDURE — 3078F DIAST BP <80 MM HG: CPT | Mod: CPTII,,, | Performed by: OBSTETRICS & GYNECOLOGY

## 2024-10-01 PROCEDURE — 0352U VAGINOSIS SCREEN BY DNA PROBE: CPT | Performed by: OBSTETRICS & GYNECOLOGY
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
## 2024-10-03 ENCOUNTER — TELEPHONE (OUTPATIENT)
Dept: OBSTETRICS AND GYNECOLOGY | Facility: CLINIC | Age: 54
End: 2024-10-03
Payer: MEDICAID

## 2024-10-03 ENCOUNTER — TELEPHONE (OUTPATIENT)
Dept: OBSTETRICS AND GYNECOLOGY | Facility: HOSPITAL | Age: 54
End: 2024-10-03
Payer: MEDICAID

## 2024-10-03 DIAGNOSIS — N76.0 BACTERIAL VAGINOSIS: Primary | ICD-10-CM

## 2024-10-03 DIAGNOSIS — B96.89 BACTERIAL VAGINOSIS: Primary | ICD-10-CM

## 2024-10-03 LAB
BACTERIAL VAGINOSIS DNA: POSITIVE
CANDIDA GLABRATA/KRUSEI: NOT DETECTED
CANDIDA RRNA VAG QL PROBE: NOT DETECTED
TRICHOMONAS VAGINALIS: NOT DETECTED

## 2024-10-03 RX ORDER — METRONIDAZOLE 500 MG/1
500 TABLET ORAL EVERY 12 HOURS
Qty: 14 TABLET | Refills: 0 | Status: SHIPPED | OUTPATIENT
Start: 2024-10-03 | End: 2024-10-10

## 2024-10-03 NOTE — TELEPHONE ENCOUNTER
Pt vm is not set up at this time. Unable to leave message.     ----- Message from Marciano Guadalupe MD sent at 10/3/2024  9:04 AM CDT -----  Call pt and tell her she tested + for BV and med sent to pharm

## 2024-10-04 LAB
C TRACH DNA SPEC QL NAA+PROBE: NOT DETECTED
N GONORRHOEA DNA SPEC QL NAA+PROBE: NOT DETECTED

## 2024-10-10 ENCOUNTER — TELEPHONE (OUTPATIENT)
Dept: OBSTETRICS AND GYNECOLOGY | Facility: HOSPITAL | Age: 54
End: 2024-10-10
Payer: MEDICAID

## 2025-05-16 ENCOUNTER — NURSE TRIAGE (OUTPATIENT)
Dept: ADMINISTRATIVE | Facility: CLINIC | Age: 55
End: 2025-05-16
Payer: MEDICAID

## 2025-05-17 ENCOUNTER — HOSPITAL ENCOUNTER (EMERGENCY)
Facility: HOSPITAL | Age: 55
Discharge: HOME | End: 2025-05-17
Attending: EMERGENCY MEDICINE
Payer: MEDICAID

## 2025-05-17 VITALS
HEART RATE: 104 BPM | TEMPERATURE: 98 F | WEIGHT: 155 LBS | HEIGHT: 59 IN | SYSTOLIC BLOOD PRESSURE: 114 MMHG | BODY MASS INDEX: 31.25 KG/M2 | RESPIRATION RATE: 74 BRPM | OXYGEN SATURATION: 96 % | DIASTOLIC BLOOD PRESSURE: 76 MMHG

## 2025-05-17 DIAGNOSIS — T25.222A LEFT FOOT BURN, SECOND DEGREE, INITIAL ENCOUNTER: ICD-10-CM

## 2025-05-17 DIAGNOSIS — M79.606 LEG PAIN: ICD-10-CM

## 2025-05-17 DIAGNOSIS — S86.812A STRAIN OF CALF MUSCLE, LEFT, INITIAL ENCOUNTER: Primary | ICD-10-CM

## 2025-05-17 PROCEDURE — 99283 EMERGENCY DEPT VISIT LOW MDM: CPT | Mod: ER

## 2025-05-17 RX ORDER — CEPHALEXIN 500 MG/1
500 CAPSULE ORAL 4 TIMES DAILY
Qty: 20 CAPSULE | Refills: 0 | Status: SHIPPED | OUTPATIENT
Start: 2025-05-17 | End: 2025-05-22

## 2025-05-17 NOTE — FIRST PROVIDER EVALUATION
Emergency Department TeleTriage Encounter Note      CHIEF COMPLAINT    Chief Complaint   Patient presents with    Foot Pain     Pt reports burning her left foot earlier in the week on a heating pad and now states she cannot bare weight on the foot. No meds. Denies numbness or tingling. Blister noted to left heel.     Leg Pain     Pt also reports left calf pain. Denies SOB or chest pain.        VITAL SIGNS   Initial Vitals [05/17/25 1053]   BP Pulse Resp Temp SpO2   125/70 104 20 98.3 °F (36.8 °C) 99 %      MAP       --            ALLERGIES    Review of patient's allergies indicates:  No Known Allergies    PROVIDER TRIAGE NOTE  This is a teletriage evaluation of a 54 y.o. female presenting to the ED complaining of leg pain. Patient reports left calf pain. Worsens with palpation or walking. She denies trauma or injury to the calf. She has a burn to the heel from a heating pad.    Patient is alert and oriented. She speaks in complete sentences. She is sitting upright in the chair in no distress.     Initial orders will be placed and care will be transferred to an alternate provider when patient is roomed for a full evaluation. Any additional orders and the final disposition will be determined by that provider.         ORDERS  Labs Reviewed - No data to display    ED Orders (720h ago, onward)      Start Ordered     Status Ordering Provider    05/17/25 1110 05/17/25 1109  US Lower Extremity Veins Left  1 time imaging         Ordered ARIADNE VILLA              Virtual Visit Note: The provider triage portion of this emergency department evaluation and documentation was performed via [email or web address], a HIPAA-compliant telemedicine application, in concert with a tele-presenter in the room. A face to face patient evaluation with one of my colleagues will occur once the patient is placed in an emergency department room.      DISCLAIMER: This note was prepared with [employer] voice recognition transcription software. Phillip  syntax, mangled pronouns, and other bizarre constructions may be attributed to that software system.

## 2025-05-17 NOTE — TELEPHONE ENCOUNTER
Pt reports left leg pain in her calf, not able to walk and it hurts to touch the area. Pt advised to go to the ED now per protocol, Pt encouraged to call back with any worsening symptoms or questions. She verbalized understanding.    Reason for Disposition   Unable to walk    Additional Information   Negative: Looks like a broken bone or dislocated joint (e.g., crooked or deformed)   Negative: Sounds like a life-threatening emergency to the triager   Negative: Chest pain   Negative: Difficulty breathing   Negative: Entire foot is cool or blue in comparison to other side    Protocols used: Leg Pain-A-AH

## 2025-05-17 NOTE — ED NOTES
"Pt reports that she burned the heel of the left foot with a heating pad several days ago. Since then she has been walking on "tip toe". She is now having pain to the left calf that is more severe when walking.   "
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
## 2025-05-27 ENCOUNTER — HOSPITAL ENCOUNTER (OUTPATIENT)
Dept: RADIOLOGY | Facility: HOSPITAL | Age: 55
Discharge: HOME | End: 2025-05-27
Payer: MEDICAID

## 2025-05-27 ENCOUNTER — LAB VISIT (OUTPATIENT)
Dept: LAB | Facility: HOSPITAL | Age: 55
End: 2025-05-27
Payer: MEDICAID

## 2025-05-27 DIAGNOSIS — E55.9 VITAMIN D DEFICIENCY, UNSPECIFIED: ICD-10-CM

## 2025-05-27 DIAGNOSIS — I10 ESSENTIAL (PRIMARY) HYPERTENSION: ICD-10-CM

## 2025-05-27 DIAGNOSIS — T25.222A SECOND DEGREE BURN OF LEFT FOOT, INITIAL ENCOUNTER: ICD-10-CM

## 2025-05-27 DIAGNOSIS — R79.89 OTHER SPECIFIED ABNORMAL FINDINGS OF BLOOD CHEMISTRY: ICD-10-CM

## 2025-05-27 DIAGNOSIS — R73.09 OTHER ABNORMAL GLUCOSE: Primary | ICD-10-CM

## 2025-05-27 LAB
ABSOLUTE EOSINOPHIL (OHS): 0.08 K/UL
ABSOLUTE MONOCYTE (OHS): 0.33 K/UL (ref 0.3–1)
ABSOLUTE NEUTROPHIL COUNT (OHS): 1.35 K/UL (ref 1.8–7.7)
ALBUMIN SERPL BCP-MCNC: 4.3 G/DL (ref 3.5–5.2)
ALP SERPL-CCNC: 71 UNIT/L (ref 38–126)
ALT SERPL W/O P-5'-P-CCNC: 20 UNIT/L (ref 10–44)
ANION GAP (OHS): 12 MMOL/L (ref 8–16)
AST SERPL-CCNC: 25 UNIT/L (ref 15–46)
BASOPHILS # BLD AUTO: 0.01 K/UL
BASOPHILS NFR BLD AUTO: 0.3 %
BILIRUB SERPL-MCNC: 0.3 MG/DL (ref 0.1–1)
BUN SERPL-MCNC: 16 MG/DL (ref 7–17)
CALCIUM SERPL-MCNC: 9.8 MG/DL (ref 8.7–10.5)
CHLORIDE SERPL-SCNC: 102 MMOL/L (ref 95–110)
CHOLEST SERPL-MCNC: 204 MG/DL (ref 120–199)
CHOLEST/HDLC SERPL: 2.6 {RATIO} (ref 2–5)
CO2 SERPL-SCNC: 26 MMOL/L (ref 23–29)
CREAT SERPL-MCNC: 1.3 MG/DL (ref 0.5–1.4)
EAG (OHS): 111 MG/DL (ref 68–131)
ERYTHROCYTE [DISTWIDTH] IN BLOOD BY AUTOMATED COUNT: 14.7 % (ref 11.5–14.5)
GFR SERPLBLD CREATININE-BSD FMLA CKD-EPI: 48 ML/MIN/1.73/M2
GLUCOSE SERPL-MCNC: 83 MG/DL (ref 70–110)
HBA1C MFR BLD: 5.5 % (ref 4–5.6)
HCT VFR BLD AUTO: 36.5 % (ref 37–48.5)
HDLC SERPL-MCNC: 77 MG/DL (ref 40–75)
HDLC SERPL: 37.7 % (ref 20–50)
HGB BLD-MCNC: 11.9 GM/DL (ref 12–16)
IMM GRANULOCYTES # BLD AUTO: 0 K/UL (ref 0–0.04)
IMM GRANULOCYTES NFR BLD AUTO: 0 % (ref 0–0.5)
LDLC SERPL CALC-MCNC: 114.8 MG/DL (ref 63–159)
LYMPHOCYTES # BLD AUTO: 1.65 K/UL (ref 1–4.8)
MCH RBC QN AUTO: 27.1 PG (ref 27–31)
MCHC RBC AUTO-ENTMCNC: 32.6 G/DL (ref 32–36)
MCV RBC AUTO: 83 FL (ref 82–98)
NONHDLC SERPL-MCNC: 127 MG/DL
NUCLEATED RBC (/100WBC) (OHS): 0 /100 WBC
PLATELET # BLD AUTO: 304 K/UL (ref 150–450)
PMV BLD AUTO: 10.8 FL (ref 9.2–12.9)
POTASSIUM SERPL-SCNC: 3.7 MMOL/L (ref 3.5–5.1)
PROT SERPL-MCNC: 7.6 GM/DL (ref 6–8.4)
RBC # BLD AUTO: 4.39 M/UL (ref 4–5.4)
RELATIVE EOSINOPHIL (OHS): 2.3 %
RELATIVE LYMPHOCYTE (OHS): 48.2 % (ref 18–48)
RELATIVE MONOCYTE (OHS): 9.6 % (ref 4–15)
RELATIVE NEUTROPHIL (OHS): 39.6 % (ref 38–73)
SODIUM SERPL-SCNC: 140 MMOL/L (ref 136–145)
T4 FREE SERPL-MCNC: 0.81 NG/DL (ref 0.71–1.51)
T4 FREE SERPL-MCNC: 0.85 NG/DL (ref 0.71–1.51)
TRIGL SERPL-MCNC: 61 MG/DL (ref 30–150)
TSH SERPL-ACNC: 0.34 UIU/ML (ref 0.4–4)
WBC # BLD AUTO: 3.42 K/UL (ref 3.9–12.7)

## 2025-05-27 PROCEDURE — 83036 HEMOGLOBIN GLYCOSYLATED A1C: CPT | Mod: PN

## 2025-05-27 PROCEDURE — 73650 X-RAY EXAM OF HEEL: CPT | Mod: 26,LT,, | Performed by: RADIOLOGY

## 2025-05-27 PROCEDURE — 73650 X-RAY EXAM OF HEEL: CPT | Mod: TC,FY,PN,LT

## 2025-05-27 PROCEDURE — 80053 COMPREHEN METABOLIC PANEL: CPT | Mod: PN

## 2025-05-27 PROCEDURE — 84443 ASSAY THYROID STIM HORMONE: CPT | Mod: PN

## 2025-05-27 PROCEDURE — 84439 ASSAY OF FREE THYROXINE: CPT | Mod: PN

## 2025-05-27 PROCEDURE — 82465 ASSAY BLD/SERUM CHOLESTEROL: CPT | Mod: PN

## 2025-05-27 PROCEDURE — 85025 COMPLETE CBC W/AUTO DIFF WBC: CPT | Mod: PN

## 2025-05-27 PROCEDURE — 36415 COLL VENOUS BLD VENIPUNCTURE: CPT | Mod: PN

## 2025-05-28 LAB — 25(OH)D3+25(OH)D2 SERPL-MCNC: 26 NG/ML (ref 30–96)

## (undated) DEVICE — HEMOSTAT SURGICEL 4X8IN

## (undated) DEVICE — SUT MONOCRYL 4-0 PS-2

## (undated) DEVICE — TROCAR ENDOPATH XCEL 5X75MM

## (undated) DEVICE — DISSECTOR EPIX LAPA 5MMX35CM

## (undated) DEVICE — SCISSOR 5MMX35CM DIRECT DRIVE

## (undated) DEVICE — NDL INSUF ULTRA VERESS 120MM

## (undated) DEVICE — TROCAR ENDOPATH XCEL 11MM 10CM

## (undated) DEVICE — IRRIGATOR ENDOSCOPY DISP.

## (undated) DEVICE — SPONGE DERMA 8PLY 2X2

## (undated) DEVICE — APPLIER CLIP ENDO LIGAMAX 5MM

## (undated) DEVICE — POWDER ARISTA AH 3G

## (undated) DEVICE — SOL NS 1000CC

## (undated) DEVICE — SEE MEDLINE ITEM 156952

## (undated) DEVICE — ELECTRODE REM PLYHSV RETURN 9

## (undated) DEVICE — GLOVE BIOGEL ECLIPSE SZ 7.5

## (undated) DEVICE — CONTAINER MULTIPURPOSE/SPECIME

## (undated) DEVICE — TROCAR ENDOPATH XCEL 5MM 7.5CM

## (undated) DEVICE — DRESSING TEGADERM 2 3/8 X 2.75

## (undated) DEVICE — DRESSING XEROFORM FOIL PK 1X8

## (undated) DEVICE — SUT 0 VICRYL / UR6 (J603)

## (undated) DEVICE — BAG TISSUE RETRIEVAL 225ML

## (undated) DEVICE — MANIFOLD 4 PORT

## (undated) DEVICE — NDL 22GA X1 1/2 REG BEVEL

## (undated) DEVICE — APPLICATOR ARISTA FLEX XL